# Patient Record
Sex: FEMALE | Race: WHITE | HISPANIC OR LATINO | Employment: FULL TIME
[De-identification: names, ages, dates, MRNs, and addresses within clinical notes are randomized per-mention and may not be internally consistent; named-entity substitution may affect disease eponyms.]

---

## 2017-09-03 ENCOUNTER — HEALTH MAINTENANCE LETTER (OUTPATIENT)
Age: 27
End: 2017-09-03

## 2017-09-11 ENCOUNTER — HOSPITAL ENCOUNTER (EMERGENCY)
Facility: CLINIC | Age: 27
Discharge: HOME | End: 2017-09-11
Attending: EMERGENCY MEDICINE | Admitting: EMERGENCY MEDICINE
Payer: COMMERCIAL

## 2017-09-11 ENCOUNTER — APPOINTMENT (OUTPATIENT)
Dept: CT IMAGING | Facility: CLINIC | Age: 27
End: 2017-09-11
Attending: EMERGENCY MEDICINE
Payer: COMMERCIAL

## 2017-09-11 ENCOUNTER — APPOINTMENT (OUTPATIENT)
Dept: ULTRASOUND IMAGING | Facility: CLINIC | Age: 27
End: 2017-09-11
Attending: EMERGENCY MEDICINE
Payer: COMMERCIAL

## 2017-09-11 VITALS — SYSTOLIC BLOOD PRESSURE: 95 MMHG | OXYGEN SATURATION: 100 % | DIASTOLIC BLOOD PRESSURE: 58 MMHG

## 2017-09-11 DIAGNOSIS — N83.202 OVARIAN CYST, BILATERAL: ICD-10-CM

## 2017-09-11 DIAGNOSIS — R10.2 PELVIC PAIN IN FEMALE: ICD-10-CM

## 2017-09-11 DIAGNOSIS — N93.8 DYSFUNCTIONAL UTERINE BLEEDING: ICD-10-CM

## 2017-09-11 DIAGNOSIS — N39.0 URINARY TRACT INFECTION, SITE UNSPECIFIED: ICD-10-CM

## 2017-09-11 DIAGNOSIS — N83.201 OVARIAN CYST, BILATERAL: ICD-10-CM

## 2017-09-11 LAB
ALBUMIN SERPL-MCNC: 4 G/DL (ref 3.4–5)
ALBUMIN UR-MCNC: NEGATIVE MG/DL
ALP SERPL-CCNC: 76 U/L (ref 40–150)
ALT SERPL W P-5'-P-CCNC: 35 U/L (ref 0–50)
ANION GAP SERPL CALCULATED.3IONS-SCNC: 8 MMOL/L (ref 3–14)
APPEARANCE UR: ABNORMAL
AST SERPL W P-5'-P-CCNC: 24 U/L (ref 0–45)
BACTERIA #/AREA URNS HPF: ABNORMAL /HPF
BASOPHILS # BLD AUTO: 0 10E9/L (ref 0–0.2)
BASOPHILS NFR BLD AUTO: 0.1 %
BILIRUB SERPL-MCNC: 0.7 MG/DL (ref 0.2–1.3)
BILIRUB UR QL STRIP: NEGATIVE
BUN SERPL-MCNC: 9 MG/DL (ref 7–30)
CALCIUM SERPL-MCNC: 8.9 MG/DL (ref 8.5–10.1)
CHLORIDE SERPL-SCNC: 105 MMOL/L (ref 94–109)
CO2 SERPL-SCNC: 23 MMOL/L (ref 20–32)
COLOR UR AUTO: YELLOW
CREAT SERPL-MCNC: 0.48 MG/DL (ref 0.52–1.04)
DIFFERENTIAL METHOD BLD: ABNORMAL
EOSINOPHIL # BLD AUTO: 0.1 10E9/L (ref 0–0.7)
EOSINOPHIL NFR BLD AUTO: 0.4 %
ERYTHROCYTE [DISTWIDTH] IN BLOOD BY AUTOMATED COUNT: 13.2 % (ref 10–15)
GFR SERPL CREATININE-BSD FRML MDRD: >90 ML/MIN/1.7M2
GLUCOSE SERPL-MCNC: 97 MG/DL (ref 70–99)
GLUCOSE UR STRIP-MCNC: NEGATIVE MG/DL
HCG SERPL QL: NEGATIVE
HCT VFR BLD AUTO: 37.5 % (ref 35–47)
HGB BLD-MCNC: 12.8 G/DL (ref 11.7–15.7)
HGB UR QL STRIP: ABNORMAL
IMM GRANULOCYTES # BLD: 0.1 10E9/L (ref 0–0.4)
IMM GRANULOCYTES NFR BLD: 0.3 %
KETONES UR STRIP-MCNC: NEGATIVE MG/DL
LEUKOCYTE ESTERASE UR QL STRIP: ABNORMAL
LIPASE SERPL-CCNC: 133 U/L (ref 73–393)
LYMPHOCYTES # BLD AUTO: 1.6 10E9/L (ref 0.8–5.3)
LYMPHOCYTES NFR BLD AUTO: 9.9 %
MCH RBC QN AUTO: 28.6 PG (ref 26.5–33)
MCHC RBC AUTO-ENTMCNC: 34.1 G/DL (ref 31.5–36.5)
MCV RBC AUTO: 84 FL (ref 78–100)
MONOCYTES # BLD AUTO: 1 10E9/L (ref 0–1.3)
MONOCYTES NFR BLD AUTO: 6 %
MUCOUS THREADS #/AREA URNS LPF: PRESENT /LPF
NEUTROPHILS # BLD AUTO: 13.4 10E9/L (ref 1.6–8.3)
NEUTROPHILS NFR BLD AUTO: 83.3 %
NITRATE UR QL: NEGATIVE
PH UR STRIP: 6 PH (ref 5–7)
PLATELET # BLD AUTO: 286 10E9/L (ref 150–450)
POTASSIUM SERPL-SCNC: 4.4 MMOL/L (ref 3.4–5.3)
PROT SERPL-MCNC: 7.4 G/DL (ref 6.8–8.8)
RBC # BLD AUTO: 4.47 10E12/L (ref 3.8–5.2)
RBC #/AREA URNS AUTO: 4 /HPF (ref 0–2)
SODIUM SERPL-SCNC: 136 MMOL/L (ref 133–144)
SOURCE: ABNORMAL
SP GR UR STRIP: 1.01 (ref 1–1.03)
SPECIMEN SOURCE: NORMAL
SQUAMOUS #/AREA URNS AUTO: 7 /HPF (ref 0–1)
UROBILINOGEN UR STRIP-MCNC: NORMAL MG/DL (ref 0–2)
WBC # BLD AUTO: 16.1 10E9/L (ref 4–11)
WBC #/AREA URNS AUTO: 3 /HPF (ref 0–2)
WET PREP SPEC: NORMAL

## 2017-09-11 PROCEDURE — 81001 URINALYSIS AUTO W/SCOPE: CPT | Performed by: EMERGENCY MEDICINE

## 2017-09-11 PROCEDURE — 96376 TX/PRO/DX INJ SAME DRUG ADON: CPT

## 2017-09-11 PROCEDURE — 80053 COMPREHEN METABOLIC PANEL: CPT | Performed by: EMERGENCY MEDICINE

## 2017-09-11 PROCEDURE — 85025 COMPLETE CBC W/AUTO DIFF WBC: CPT | Performed by: EMERGENCY MEDICINE

## 2017-09-11 PROCEDURE — 87491 CHLMYD TRACH DNA AMP PROBE: CPT | Performed by: EMERGENCY MEDICINE

## 2017-09-11 PROCEDURE — 87591 N.GONORRHOEAE DNA AMP PROB: CPT | Performed by: EMERGENCY MEDICINE

## 2017-09-11 PROCEDURE — 87086 URINE CULTURE/COLONY COUNT: CPT | Performed by: EMERGENCY MEDICINE

## 2017-09-11 PROCEDURE — 25000132 ZZH RX MED GY IP 250 OP 250 PS 637: Performed by: EMERGENCY MEDICINE

## 2017-09-11 PROCEDURE — 93976 VASCULAR STUDY: CPT

## 2017-09-11 PROCEDURE — 87088 URINE BACTERIA CULTURE: CPT | Performed by: EMERGENCY MEDICINE

## 2017-09-11 PROCEDURE — 25000125 ZZHC RX 250: Performed by: EMERGENCY MEDICINE

## 2017-09-11 PROCEDURE — 96365 THER/PROPH/DIAG IV INF INIT: CPT

## 2017-09-11 PROCEDURE — 87210 SMEAR WET MOUNT SALINE/INK: CPT | Performed by: EMERGENCY MEDICINE

## 2017-09-11 PROCEDURE — 25000128 H RX IP 250 OP 636

## 2017-09-11 PROCEDURE — 84703 CHORIONIC GONADOTROPIN ASSAY: CPT | Performed by: EMERGENCY MEDICINE

## 2017-09-11 PROCEDURE — 83690 ASSAY OF LIPASE: CPT | Performed by: EMERGENCY MEDICINE

## 2017-09-11 PROCEDURE — 99285 EMERGENCY DEPT VISIT HI MDM: CPT | Mod: 25

## 2017-09-11 PROCEDURE — 74177 CT ABD & PELVIS W/CONTRAST: CPT

## 2017-09-11 PROCEDURE — 99285 EMERGENCY DEPT VISIT HI MDM: CPT | Performed by: EMERGENCY MEDICINE

## 2017-09-11 PROCEDURE — 25000128 H RX IP 250 OP 636: Performed by: EMERGENCY MEDICINE

## 2017-09-11 PROCEDURE — 96375 TX/PRO/DX INJ NEW DRUG ADDON: CPT

## 2017-09-11 PROCEDURE — 87186 SC STD MICRODIL/AGAR DIL: CPT | Performed by: EMERGENCY MEDICINE

## 2017-09-11 PROCEDURE — 96361 HYDRATE IV INFUSION ADD-ON: CPT | Mod: 59

## 2017-09-11 RX ORDER — IOPAMIDOL 755 MG/ML
59 INJECTION, SOLUTION INTRAVASCULAR ONCE
Status: COMPLETED | OUTPATIENT
Start: 2017-09-11 | End: 2017-09-11

## 2017-09-11 RX ORDER — METOCLOPRAMIDE 10 MG/1
10 TABLET ORAL 4 TIMES DAILY PRN
Qty: 24 TABLET | Refills: 1 | Status: SHIPPED | OUTPATIENT
Start: 2017-09-11 | End: 2019-05-09

## 2017-09-11 RX ORDER — ONDANSETRON 2 MG/ML
INJECTION INTRAMUSCULAR; INTRAVENOUS
Status: COMPLETED
Start: 2017-09-11 | End: 2017-09-11

## 2017-09-11 RX ORDER — HYDROMORPHONE HYDROCHLORIDE 1 MG/ML
0.5 INJECTION, SOLUTION INTRAMUSCULAR; INTRAVENOUS; SUBCUTANEOUS ONCE
Status: COMPLETED | OUTPATIENT
Start: 2017-09-11 | End: 2017-09-11

## 2017-09-11 RX ORDER — CIPROFLOXACIN 500 MG/1
500 TABLET, FILM COATED ORAL 2 TIMES DAILY
Qty: 14 TABLET | Refills: 0 | Status: SHIPPED | OUTPATIENT
Start: 2017-09-11 | End: 2017-09-11

## 2017-09-11 RX ORDER — ONDANSETRON 2 MG/ML
4 INJECTION INTRAMUSCULAR; INTRAVENOUS ONCE
Status: COMPLETED | OUTPATIENT
Start: 2017-09-11 | End: 2017-09-11

## 2017-09-11 RX ORDER — METOCLOPRAMIDE HYDROCHLORIDE 5 MG/ML
10 INJECTION INTRAMUSCULAR; INTRAVENOUS ONCE
Status: COMPLETED | OUTPATIENT
Start: 2017-09-11 | End: 2017-09-11

## 2017-09-11 RX ORDER — CEFTRIAXONE 1 G/1
1 INJECTION, POWDER, FOR SOLUTION INTRAMUSCULAR; INTRAVENOUS ONCE
Status: COMPLETED | OUTPATIENT
Start: 2017-09-11 | End: 2017-09-11

## 2017-09-11 RX ORDER — AZITHROMYCIN 250 MG/1
1000 TABLET, FILM COATED ORAL ONCE
Status: COMPLETED | OUTPATIENT
Start: 2017-09-11 | End: 2017-09-11

## 2017-09-11 RX ORDER — METOCLOPRAMIDE 10 MG/1
10 TABLET ORAL 4 TIMES DAILY PRN
Qty: 24 TABLET | Refills: 1 | Status: SHIPPED | OUTPATIENT
Start: 2017-09-11 | End: 2017-09-11

## 2017-09-11 RX ORDER — ONDANSETRON 4 MG/1
4 TABLET, ORALLY DISINTEGRATING ORAL EVERY 8 HOURS PRN
Qty: 15 TABLET | Refills: 1 | Status: SHIPPED | OUTPATIENT
Start: 2017-09-11 | End: 2017-09-11

## 2017-09-11 RX ORDER — ONDANSETRON 4 MG/1
4 TABLET, ORALLY DISINTEGRATING ORAL EVERY 8 HOURS PRN
Qty: 15 TABLET | Refills: 1 | Status: SHIPPED | OUTPATIENT
Start: 2017-09-11 | End: 2019-05-09

## 2017-09-11 RX ORDER — OXYCODONE AND ACETAMINOPHEN 5; 325 MG/1; MG/1
2 TABLET ORAL ONCE
Status: DISCONTINUED | OUTPATIENT
Start: 2017-09-11 | End: 2017-09-11 | Stop reason: HOSPADM

## 2017-09-11 RX ORDER — TRAMADOL HYDROCHLORIDE 50 MG/1
50-100 TABLET ORAL EVERY 6 HOURS PRN
Qty: 20 TABLET | Refills: 0 | Status: SHIPPED | OUTPATIENT
Start: 2017-09-11 | End: 2019-05-09

## 2017-09-11 RX ORDER — CIPROFLOXACIN 500 MG/1
500 TABLET, FILM COATED ORAL 2 TIMES DAILY
Qty: 14 TABLET | Refills: 0 | Status: SHIPPED | OUTPATIENT
Start: 2017-09-11 | End: 2017-09-15 | Stop reason: ALTCHOICE

## 2017-09-11 RX ORDER — KETOROLAC TROMETHAMINE 30 MG/ML
30 INJECTION, SOLUTION INTRAMUSCULAR; INTRAVENOUS ONCE
Status: COMPLETED | OUTPATIENT
Start: 2017-09-11 | End: 2017-09-11

## 2017-09-11 RX ADMIN — HYDROMORPHONE HYDROCHLORIDE 0.5 MG: 1 INJECTION, SOLUTION INTRAMUSCULAR; INTRAVENOUS; SUBCUTANEOUS at 11:47

## 2017-09-11 RX ADMIN — ONDANSETRON 4 MG: 2 INJECTION INTRAMUSCULAR; INTRAVENOUS at 12:29

## 2017-09-11 RX ADMIN — SODIUM CHLORIDE 1000 ML: 9 INJECTION, SOLUTION INTRAVENOUS at 09:01

## 2017-09-11 RX ADMIN — SODIUM CHLORIDE 59 ML: 9 INJECTION, SOLUTION INTRAVENOUS at 10:17

## 2017-09-11 RX ADMIN — ONDANSETRON 4 MG: 2 INJECTION INTRAMUSCULAR; INTRAVENOUS at 09:02

## 2017-09-11 RX ADMIN — HYDROMORPHONE HYDROCHLORIDE 0.5 MG: 1 INJECTION, SOLUTION INTRAMUSCULAR; INTRAVENOUS; SUBCUTANEOUS at 09:05

## 2017-09-11 RX ADMIN — CEFTRIAXONE 1 G: 1 INJECTION, POWDER, FOR SOLUTION INTRAMUSCULAR; INTRAVENOUS at 13:28

## 2017-09-11 RX ADMIN — IOPAMIDOL 59 ML: 755 INJECTION, SOLUTION INTRAVENOUS at 10:17

## 2017-09-11 RX ADMIN — KETOROLAC TROMETHAMINE 30 MG: 30 INJECTION, SOLUTION INTRAMUSCULAR at 10:04

## 2017-09-11 RX ADMIN — AZITHROMYCIN 1000 MG: 250 TABLET, FILM COATED ORAL at 13:30

## 2017-09-11 RX ADMIN — METOCLOPRAMIDE 10 MG: 5 INJECTION, SOLUTION INTRAMUSCULAR; INTRAVENOUS at 13:25

## 2017-09-11 NOTE — ED AVS SNAPSHOT
[facility] Emergency Department    [address] [address]    Phone:  [phone]    Fax:  [phone]                                       Tiara Johnston   MRN: [id number]    Department:  [facility] Emergency Department   Date of Visit:  9/11/2017           After Visit Summary Signature Page     I have received my discharge instructions, and my questions have been answered. I have discussed any challenges I see with this plan with the nurse or doctor.    ..........................................................................................................................................  Patient/Patient Representative Signature      ..........................................................................................................................................  Patient Representative Print Name and Relationship to Patient    ..................................................               ................................................  Date                                            Time    ..........................................................................................................................................  Reviewed by Signature/Title    ...................................................              ..............................................  Date                                                            Time

## 2017-09-11 NOTE — ED AVS SNAPSHOT
[facility] Emergency Department    [address] [facility] [address]    Phone:  [phone]    Fax:  [phone]                                       Tiara Johnston   MRN: [id number]    Department:  [facility] Emergency Department   Date of Visit:  9/11/2017           Patient Information     Date Of Birth          1990        Your diagnoses for this visit were:     Pelvic pain in female     Ovarian cyst, bilateral     Dysfunctional uterine bleeding     Urinary tract infection, site unspecified        You were seen by Mian Goldberg MD.      Follow-up Information     Schedule an appointment as soon as possible for a visit with [facility].    Contact information:    [address] [place] [id number]          Please follow up.    Why:  If symptoms worsen, fever, new problems or concerns.      Discharge References/Attachments     OVARIAN CYSTS, UNDERSTANDING (ENGLISH)    OVARIAN CYST (ENGLISH)    OVARIAN CYSTS (ENGLISH)    DYSFUNCTIONAL UTERINE BLEEDING (ENGLISH)    PELVIC PAIN, UNKNOWN CAUSE (ENGLISH)    URINARY TRACT INFECTIONS IN WOMEN (ENGLISH)    URINARY TRACT INFECTIONS (UTIS), UNDERSTANDING (ENGLISH)      24 Hour Appointment Hotline       To make an appointment at any [facility] clinic, call [phone] ([phone]). If you don't have a family doctor or clinic, we will help you find one. [facility] clinics are conveniently located to serve the needs of you and your family.             Review of your medicines      START taking        Dose / Directions Last dose taken    ciprofloxacin 500 MG tablet   Commonly known as:  CIPRO   Dose:  500 mg   Quantity:  14 tablet        Take 1 tablet (500 mg) by mouth 2 times daily   Refills:  0        metoclopramide 10 MG tablet   Commonly known as:  REGLAN   Dose:  10 mg   Quantity:  24 tablet        Take 1 tablet (10 mg) by mouth 4 times daily as needed for nausea   Refills:  1        ondansetron 4 MG ODT tab    Commonly known as:  ZOFRAN ODT   Dose:  4 mg   Quantity:  15 tablet        Take 1 tablet (4 mg) by mouth every 8 hours as needed for nausea   Refills:  1        traMADol 50 MG tablet   Commonly known as:  ULTRAM   Dose:   mg   Quantity:  20 tablet        Take 1-2 tablets ( mg) by mouth every 6 hours as needed for pain   Refills:  0                Prescriptions were sent or printed at these locations (4 Prescriptions)                   [facility] - [place], [place] - [address]   [address] [address] [facility] [address]    Telephone:  [phone]   Fax:  [phone]   Hours:                  E-Prescribed (3 of 3)         ondansetron (ZOFRAN ODT) 4 MG ODT tab               metoclopramide (REGLAN) 10 MG tablet               ciprofloxacin (CIPRO) 500 MG tablet                     [facility] [place] [place] - [address] [address]   [address] [address]    Telephone:  [phone]   Fax:  [phone]   Hours:  LIVAN Morgan                Printed at Department/Unit printer (1 of 1)         traMADol (ULTRAM) 50 MG tablet                Procedures and tests performed during your visit     Abd/pelvis CT, IV contrast only TRAUMA  / AAA    CBC with platelets, differential    Chlamydia trachomatis PCR    Comprehensive metabolic panel    HCG qualitative pregnancy (blood)    Lipase    Neisseria gonorrhoea PCR    Pelvic Ultrasound (US) with doppler imaging (r/o ovarian torsion)    Saline Lock IV    UA with Microscopic    Urine Culture    Wet prep      Orders Needing Specimen Collection     None      Pending Results     Date and Time Order Name Status Description    9/11/2017 1115 Neisseria gonorrhoea PCR In process     9/11/2017 1115 Chlamydia trachomatis PCR In process     9/11/2017 0953 Urine Culture Preliminary             Pending Culture Results     Date and Time Order Name Status Description    9/11/2017 1115 Neisseria gonorrhoea PCR In process      9/11/2017 1115 Chlamydia trachomatis PCR In process     9/11/2017 0953 Urine Culture Preliminary             Pending Results Instructions     If you had any lab results that were not finalized at the time of your Discharge, you can call the ED Lab Result RN at [phone]. You will be contacted by this team for any positive Lab results or changes in treatment. The nurses are available 7 days a week from 10A to 6:30P.  You can leave a message 24 hours per day and they will return your call.        Test Results From Your Hospital Stay        9/11/2017  8:34 AM      Component Results     Component Value Ref Range & Units Status    WBC 16.1 (H) 4.0 - 11.0 10e9/L Final    RBC Count 4.47 3.8 - 5.2 10e12/L Final    Hemoglobin 12.8 11.7 - 15.7 g/dL Final    Hematocrit 37.5 35.0 - 47.0 % Final    MCV 84 78 - 100 fl Final    MCH 28.6 26.5 - 33.0 pg Final    MCHC 34.1 31.5 - 36.5 g/dL Final    RDW 13.2 10.0 - 15.0 % Final    Platelet Count 286 150 - 450 10e9/L Final    Diff Method Automated Method  Final    % Neutrophils 83.3 % Final    % Lymphocytes 9.9 % Final    % Monocytes 6.0 % Final    % Eosinophils 0.4 % Final    % Basophils 0.1 % Final    % Immature Granulocytes 0.3 % Final    Absolute Neutrophil 13.4 (H) 1.6 - 8.3 10e9/L Final    Absolute Lymphocytes 1.6 0.8 - 5.3 10e9/L Final    Absolute Monocytes 1.0 0.0 - 1.3 10e9/L Final    Absolute Eosinophils 0.1 0.0 - 0.7 10e9/L Final    Absolute Basophils 0.0 0.0 - 0.2 10e9/L Final    Abs Immature Granulocytes 0.1 0 - 0.4 10e9/L Final         9/11/2017 10:07 AM      Component Results     Component Value Ref Range & Units Status    Sodium 136 133 - 144 mmol/L Final    Potassium 4.4 3.4 - 5.3 mmol/L Final    Specimen slightly hemolyzed, potassium may be falsely elevated    Chloride 105 94 - 109 mmol/L Final    Carbon Dioxide 23 20 - 32 mmol/L Final    Anion Gap 8 3 - 14 mmol/L Final    Glucose 97 70 - 99 mg/dL Final    Urea Nitrogen 9 7 - 30 mg/dL Final    Creatinine 0.48  (L) 0.52 - 1.04 mg/dL Final    GFR Estimate >90 >60 mL/min/1.7m2 Final    Non  GFR Calc    GFR Estimate If Black >90 >60 mL/min/1.7m2 Final    African American GFR Calc    Calcium 8.9 8.5 - 10.1 mg/dL Final    Bilirubin Total 0.7 0.2 - 1.3 mg/dL Final    Albumin 4.0 3.4 - 5.0 g/dL Final    Protein Total 7.4 6.8 - 8.8 g/dL Final    Alkaline Phosphatase 76 40 - 150 U/L Final    ALT 35 0 - 50 U/L Final    AST 24 0 - 45 U/L Final    Specimen is hemolyzed which can falsely elevate AST. Analysis of a   non-hemolyzed specimen may result in a lower value.           9/11/2017 10:05 AM      Component Results     Component Value Ref Range & Units Status    HCG Qualitative Serum Negative NEG^Negative Final    This test is for screening purposes.  Results should be interpreted along with   the clinical picture.  Confirmation testing is available if warranted by   ordering [id number], HCG Quantitative Pregnancy.           9/11/2017 10:07 AM      Component Results     Component Value Ref Range & Units Status    Lipase 133 73 - 393 U/L Final         9/11/2017  9:52 AM      Component Results     Component Value Ref Range & Units Status    Color Urine Yellow  Final    Appearance Urine Slightly Cloudy  Final    Glucose Urine Negative NEG^Negative mg/dL Final    Bilirubin Urine Negative NEG^Negative Final    Ketones Urine Negative NEG^Negative mg/dL Final    Specific Gravity Urine 1.013 1.003 - 1.035 Final    Blood Urine Small (A) NEG^Negative Final    pH Urine 6.0 5.0 - 7.0 pH Final    Protein Albumin Urine Negative NEG^Negative mg/dL Final    Urobilinogen mg/dL Normal 0.0 - 2.0 mg/dL Final    Nitrite Urine Negative NEG^Negative Final    Leukocyte Esterase Urine Trace (A) NEG^Negative Final    Source Midstream Urine  Final    WBC Urine 3 (H) 0 - 2 /HPF Final    RBC Urine 4 (H) 0 - 2 /HPF Final    Bacteria Urine Few (A) NEG^Negative /HPF Final    Squamous Epithelial /HPF Urine 7 (H) 0 - 1 /HPF Corrected    Clue cells  present  CORRECTED ON 09/11 AT 0952: PREVIOUSLY REPORTED AS 7      Mucous Urine Present (A) NEG^Negative /LPF Final         9/11/2017  1:30 PM      Component Results     Component    Specimen Description    Midstream Urine    Special Requests    Specimen received in preservative    Culture Micro    PENDING         9/11/2017 10:45 AM      Narrative     CT ABDOMEN PELVIS W CONTRAST 9/11/2017 10:28 AM    TECHNIQUE: Images from diaphragm to pubic symphysis 59mL [id number]-370 IV  contrast  Radiation dose for this scan was reduced using automated exposure  control, adjustment of the mA and/or kV according to patient size, or  iterative reconstruction technique.    HISTORY: Lower abdominal pain    COMPARISON: None.    FINDINGS:   Abdomen and Pelvis: 2 subcentimeter low attenuation foci in the medial  right lobe of the liver on series 3 images 15 and 30 are  indeterminate. Normal-appearing gallbladder, spleen, pancreas, adrenal  glands and kidneys. No periaortic or pelvic adenopathy.    Rounded 3.1 cm fluid collection in the posterior low right pelvis,  favor free fluid. 2.6 cm left pelvic cyst compatible with an ovarian  cyst or dominant follicle. There is a anterior 1.4 cm right pelvic  cyst adjacent to the uterus consistent with small follicle in the  right ovary. Series 2 image 21 hypoechoic 2.2 cm area in the right  pelvis is 3.3 x 1.2 cm on axial images, possibly complex elongated  ovarian or adnexal cystic lesion.    Normal-appearing appendix (series 3 image 17-18. No acute appearing  bowel abnormality.        Impression     IMPRESSION :  1. Indeterminate elongated short tubular hypoechoic right pelvic  structure just superior to the uterus, possibly related to fallopian  tube/adnexa. Pelvic ultrasound may be helpful for further evaluation.  Small ovarian cysts. Small free fluid.  2. 2 tiny hypodense liver lesions are too small to characterize.    DORIS PAZ MD         9/11/2017 12:47 PM      Narrative     PELVIC  ULTRASOUND 9/11/2017 12:31 PM    HISTORY: Severe pelvic pain, question right fallopian tube abnormality  on CT    COMPARISON; 9/11/2017 CT abdomen and pelvis    TECHNIQUE: Transabdominal and endovaginal technique to better  visualize endometrial stripe and adnexa.    FINDINGS: Uterus is 9.6 x 4.3 x 5.8 cm in size with small amount of  fluid in the endometrial cavity and cervical canal. Endometrial stripe  (excluding the fluid) is 0.7 cm. No fibroids.    Simple appearing left ovarian cyst 3.0 x 2.2 x 2.8 cm.  Normal-appearing right ovary without dominant cyst. Doppler waveform  analysis demonstrates arterial and venous flow to both ovaries. No  sonographic evidence for hydrosalpinx. There is a 2.2 x 1.5 x 1.1 cm  slightly lobulated cystic structure in the right adnexa. Prominent  adnexal vessels in the right adnexal region as well. No free fluid.    IMPRESSION;  1. 2.2 x 1.5 x 1.1 cm bilobed cystic structure right adnexa may be a  paraovarian cyst but nonspecific. This probably corresponds to the  cystic area in the right pelvis CT coronal series 3 image 18 and axial  image 67 but is separate from the more superior hypodense area on CT  which is indeterminate but may represent a small bowel loop.  2. Normal-appearing right ovary. No evidence for hydrosalpinx.  3. 3 cm simple left ovarian cyst.  4. No free fluid seen on ultrasound although free fluid in the right  cul-de-sac is suspected on the CT.  5. Small fluid in the endometrial canal and cervical canal.    DORIS PAZ MD         9/11/2017  1:03 PM         9/11/2017  1:03 PM         9/11/2017  1:15 PM      Component Results     Component    Specimen Description    Vagina    Wet Prep    Moderate  WBC'S seen      Wet Prep    No clue cells seen    Wet Prep    No Trichomonas seen    Wet Prep    No yeast seen                Thank you for choosing [facility]       Thank you for choosing [facility] for your care. Our goal is always to provide you with excellent care.  Hearing back from our patients is one way we can continue to improve our services. Please take a few minutes to complete the written survey that you may receive in the mail after you visit with us. Thank you!        [facility] Information     [employer] gives you secure access to your electronic health record. If you see a primary care provider, you can also send messages to your care team and make appointments. If you have questions, please call your primary care clinic.  If you do not have a primary care provider, please call [phone] and they will assist you.        Care EveryWhere ID     This is your Care EveryWhere ID. This could be used by other organizations to access your [facility] medical records  [id number]        Equal Access to Services     GEORGIA ALBERTS : Sumit Bolanos, marylou peres, alvaro gaona, beatrice ascencio. So [facility] [phone].    ATENCIÓN: Si habla español, tiene a mota disposición servicios gratuitos de asistencia lingüística. Llame al [phone].    We comply with applicable federal civil rights laws and [place] laws. We do not discriminate on the basis of race, color, national origin, age, disability sex, sexual orientation or gender identity.            After Visit Summary       This is your record. Keep this with you and show to your community pharmacist(s) and doctor(s) at your next visit.

## 2017-09-11 NOTE — ED PROVIDER NOTES
History     Chief Complaint   Patient presents with     Abdominal Pain     and bloating since 2400     HPI  Tiara Johnston is a 27 year old female with sudden onset of lower abd pain at ~ midnight last night. Abd pain is constant, severe, radiates across lower abdomen, is described as a bloating, cramping, sharp pain aggravated by urinating, bowel movement, movement, change in position and walking.  She has associated low back pain, nonlocalized. No fever, feels chilled. Last BM was this am and was NL.  She has urinary urgency but no other UTI signs or symptoms or hematuria.  Last menstrual period was - but she has continued to have spotting since that time.  LMP prior to that was normal, beginning of August.  No vaginal discharge.  , monogamous.  No other acute complaints or concerns.    I have reviewed the Medications, Allergies, Past Medical and Surgical History, and Social History in the Epic system.  Patient Active Problem List   Diagnosis     H/O:  section     Thyroid nodule     Contraception     HSV-1 infection     CARDIOVASCULAR SCREENING; LDL GOAL LESS THAN 160     Guttate psoriasis     Subclinical hyperthyroidism     Past Medical History:   Diagnosis Date      delivery     placenta previa     Psoriasis      Past Surgical History:   Procedure Laterality Date     BACK SURGERY       C/SECTION, LOW TRANSVERSE  08    , Low Transverse     HEAD & NECK SURGERY       Current Facility-Administered Medications   Medication     oxyCODONE-acetaminophen (PERCOCET) 5-325 MG per tablet 2 tablet     Current Outpatient Prescriptions   Medication     traMADol (ULTRAM) 50 MG tablet     ondansetron (ZOFRAN ODT) 4 MG ODT tab     metoclopramide (REGLAN) 10 MG tablet     ciprofloxacin (CIPRO) 500 MG tablet     Allergies   Allergen Reactions     Penicillins Rash     Social History   Substance Use Topics     Smoking status: Never Smoker     Smokeless tobacco: Never Used     Alcohol use  Yes      Comment: occassional; social     Family History   Problem Relation Age of Onset     Asthma Mother      Hypertension Mother      Thyroid Disease Mother      Hypertension Maternal Grandmother      Alzheimer Disease Maternal Grandfather      Asthma Brother      Asthma Brother      Asthma Son      Respiratory Son      sleep apnea     Asthma Daughter      Respiratory Daughter      sleep apnea     Unknown/Adopted Father      Unknown/Adopted Paternal Grandmother      Unknown/Adopted Paternal Grandfather      Review of Systems  As mentioned above in the history present illness.  All other systems were reviewed and are negative.    Physical Exam      Physical Exam   Constitutional: She is oriented to person, place, and time. She appears well-developed and well-nourished. No distress.   HENT:   Head: Normocephalic and atraumatic.   Mouth/Throat: Oropharynx is clear and moist.   Eyes: Conjunctivae and EOM are normal. No scleral icterus.   Neck: Normal range of motion. Neck supple.   Cardiovascular: Normal rate, regular rhythm and normal heart sounds.  Exam reveals no gallop and no friction rub.    No murmur heard.  Pulmonary/Chest: Effort normal and breath sounds normal. No respiratory distress. She has no wheezes. She has no rales.   Abdominal: Soft. Bowel sounds are normal. She exhibits no distension, no abdominal bruit, no pulsatile midline mass and no mass. There is tenderness (diffuse lower abdominal tenderness). There is no rigidity, no rebound, no guarding, no CVA tenderness, no tenderness at McBurney's point and negative Mead's sign.       Genitourinary:   Genitourinary Comments: Pelvic examination: Normal external genitalia.  Moderate amount of thin mucousy white-yellow vaginal discharge.  Normal cervix.  Uterine tenderness and right adnexal tenderness without palpable mass.  No left adnexal tenderness or mass.   Musculoskeletal: Normal range of motion. She exhibits no edema.   Neurological: She is alert  and oriented to person, place, and time.   Skin: Skin is warm and dry. No rash noted. She is not diaphoretic. No erythema. No pallor.   Psychiatric: She has a normal mood and affect. Her behavior is normal.   Nursing note and vitals reviewed.      ED Course     ED Course     Procedures        Results for orders placed or performed during the hospital encounter of 09/11/17   Abd/pelvis CT, IV contrast only TRAUMA  / AAA    Narrative    CT ABDOMEN PELVIS W CONTRAST 9/11/2017 10:28 AM    TECHNIQUE: Images from diaphragm to pubic symphysis 59mL Isovue-370 IV  contrast  Radiation dose for this scan was reduced using automated exposure  control, adjustment of the mA and/or kV according to patient size, or  iterative reconstruction technique.    HISTORY: Lower abdominal pain    COMPARISON: None.    FINDINGS:   Abdomen and Pelvis: 2 subcentimeter low attenuation foci in the medial  right lobe of the liver on series 3 images 15 and 30 are  indeterminate. Normal-appearing gallbladder, spleen, pancreas, adrenal  glands and kidneys. No periaortic or pelvic adenopathy.    Rounded 3.1 cm fluid collection in the posterior low right pelvis,  favor free fluid. 2.6 cm left pelvic cyst compatible with an ovarian  cyst or dominant follicle. There is a anterior 1.4 cm right pelvic  cyst adjacent to the uterus consistent with small follicle in the  right ovary. Series 2 image 21 hypoechoic 2.2 cm area in the right  pelvis is 3.3 x 1.2 cm on axial images, possibly complex elongated  ovarian or adnexal cystic lesion.    Normal-appearing appendix (series 3 image 17-18. No acute appearing  bowel abnormality.      Impression    IMPRESSION :  1. Indeterminate elongated short tubular hypoechoic right pelvic  structure just superior to the uterus, possibly related to fallopian  tube/adnexa. Pelvic ultrasound may be helpful for further evaluation.  Small ovarian cysts. Small free fluid.  2. 2 tiny hypodense liver lesions are too small to  characterize.    DORIS PAZ MD   Pelvic Ultrasound (US) with doppler imaging (r/o ovarian torsion)    Narrative    PELVIC ULTRASOUND 9/11/2017 12:31 PM    HISTORY: Severe pelvic pain, question right fallopian tube abnormality  on CT    COMPARISON; 9/11/2017 CT abdomen and pelvis    TECHNIQUE: Transabdominal and endovaginal technique to better  visualize endometrial stripe and adnexa.    FINDINGS: Uterus is 9.6 x 4.3 x 5.8 cm in size with small amount of  fluid in the endometrial cavity and cervical canal. Endometrial stripe  (excluding the fluid) is 0.7 cm. No fibroids.    Simple appearing left ovarian cyst 3.0 x 2.2 x 2.8 cm.  Normal-appearing right ovary without dominant cyst. Doppler waveform  analysis demonstrates arterial and venous flow to both ovaries. No  sonographic evidence for hydrosalpinx. There is a 2.2 x 1.5 x 1.1 cm  slightly lobulated cystic structure in the right adnexa. Prominent  adnexal vessels in the right adnexal region as well. No free fluid.    IMPRESSION;  1. 2.2 x 1.5 x 1.1 cm bilobed cystic structure right adnexa may be a  paraovarian cyst but nonspecific. This probably corresponds to the  cystic area in the right pelvis CT coronal series 3 image 18 and axial  image 67 but is separate from the more superior hypodense area on CT  which is indeterminate but may represent a small bowel loop.  2. Normal-appearing right ovary. No evidence for hydrosalpinx.  3. 3 cm simple left ovarian cyst.  4. No free fluid seen on ultrasound although free fluid in the right  cul-de-sac is suspected on the CT.  5. Small fluid in the endometrial canal and cervical canal.    DORIS PAZ MD   CBC with platelets, differential   Result Value Ref Range    WBC 16.1 (H) 4.0 - 11.0 10e9/L    RBC Count 4.47 3.8 - 5.2 10e12/L    Hemoglobin 12.8 11.7 - 15.7 g/dL    Hematocrit 37.5 35.0 - 47.0 %    MCV 84 78 - 100 fl    MCH 28.6 26.5 - 33.0 pg    MCHC 34.1 31.5 - 36.5 g/dL    RDW 13.2 10.0 - 15.0 %    Platelet  Count 286 150 - 450 10e9/L    Diff Method Automated Method     % Neutrophils 83.3 %    % Lymphocytes 9.9 %    % Monocytes 6.0 %    % Eosinophils 0.4 %    % Basophils 0.1 %    % Immature Granulocytes 0.3 %    Absolute Neutrophil 13.4 (H) 1.6 - 8.3 10e9/L    Absolute Lymphocytes 1.6 0.8 - 5.3 10e9/L    Absolute Monocytes 1.0 0.0 - 1.3 10e9/L    Absolute Eosinophils 0.1 0.0 - 0.7 10e9/L    Absolute Basophils 0.0 0.0 - 0.2 10e9/L    Abs Immature Granulocytes 0.1 0 - 0.4 10e9/L   Comprehensive metabolic panel   Result Value Ref Range    Sodium 136 133 - 144 mmol/L    Potassium 4.4 3.4 - 5.3 mmol/L    Chloride 105 94 - 109 mmol/L    Carbon Dioxide 23 20 - 32 mmol/L    Anion Gap 8 3 - 14 mmol/L    Glucose 97 70 - 99 mg/dL    Urea Nitrogen 9 7 - 30 mg/dL    Creatinine 0.48 (L) 0.52 - 1.04 mg/dL    GFR Estimate >90 >60 mL/min/1.7m2    GFR Estimate If Black >90 >60 mL/min/1.7m2    Calcium 8.9 8.5 - 10.1 mg/dL    Bilirubin Total 0.7 0.2 - 1.3 mg/dL    Albumin 4.0 3.4 - 5.0 g/dL    Protein Total 7.4 6.8 - 8.8 g/dL    Alkaline Phosphatase 76 40 - 150 U/L    ALT 35 0 - 50 U/L    AST 24 0 - 45 U/L   HCG qualitative pregnancy (blood)   Result Value Ref Range    HCG Qualitative Serum Negative NEG^Negative   Lipase   Result Value Ref Range    Lipase 133 73 - 393 U/L   UA with Microscopic   Result Value Ref Range    Color Urine Yellow     Appearance Urine Slightly Cloudy     Glucose Urine Negative NEG^Negative mg/dL    Bilirubin Urine Negative NEG^Negative    Ketones Urine Negative NEG^Negative mg/dL    Specific Gravity Urine 1.013 1.003 - 1.035    Blood Urine Small (A) NEG^Negative    pH Urine 6.0 5.0 - 7.0 pH    Protein Albumin Urine Negative NEG^Negative mg/dL    Urobilinogen mg/dL Normal 0.0 - 2.0 mg/dL    Nitrite Urine Negative NEG^Negative    Leukocyte Esterase Urine Trace (A) NEG^Negative    Source Midstream Urine     WBC Urine 3 (H) 0 - 2 /HPF    RBC Urine 4 (H) 0 - 2 /HPF    Bacteria Urine Few (A) NEG^Negative /HPF     Squamous Epithelial /HPF Urine 7 (H) 0 - 1 /HPF    Mucous Urine Present (A) NEG^Negative /LPF   Urine Culture   Result Value Ref Range    Specimen Description Midstream Urine     Special Requests Specimen received in preservative     Culture Micro PENDING    Wet prep   Result Value Ref Range    Specimen Description Vagina     Wet Prep Moderate  WBC'S seen       Wet Prep No clue cells seen     Wet Prep No Trichomonas seen     Wet Prep No yeast seen         Medications   oxyCODONE-acetaminophen (PERCOCET) 5-325 MG per tablet 2 tablet (2 tablets Oral Not Given 9/11/17 1327)   HYDROmorphone (PF) (DILAUDID) injection 0.5 mg (0.5 mg Intravenous Given 9/11/17 0905)   ondansetron (ZOFRAN) injection 4 mg (4 mg Intravenous Given 9/11/17 0902)   0.9% sodium chloride BOLUS (0 mLs Intravenous Stopped 9/11/17 1148)   ketorolac (TORADOL) injection 30 mg (30 mg Intravenous Given 9/11/17 1004)   iopamidol (ISOVUE-370) solution 59 mL (59 mLs Intravenous Given 9/11/17 1017)   sodium chloride 0.9 % bag 500mL for CT scan flush use (59 mLs Intravenous Given 9/11/17 1017)   HYDROmorphone (PF) (DILAUDID) injection 0.5 mg (0.5 mg Intravenous Given 9/11/17 1147)   ondansetron (ZOFRAN) injection 4 mg (4 mg Intravenous Given 9/11/17 1229)   metoclopramide (REGLAN) injection 10 mg (10 mg Intravenous Given 9/11/17 1325)   cefTRIAXone (ROCEPHIN) 1 g vial to attach to  mL bag for ADULTS or NS 50 mL bag for PEDS (1 g Intravenous New Bag 9/11/17 1328)   azithromycin (ZITHROMAX) tablet 1,000 mg (1,000 mg Oral Given 9/11/17 1330)       1:08 PM - Reviewed case, evaluation and disposition plan with Dr. Spain, OB/GYN.  We discussed treatment, follow-up and disposition plan.      Assessments & Plan (with Medical Decision Making)   27-year-old female with with acute pelvic pain and urinary urgency with extensive evaluation remarkable for right paraovarian cyst, left ovarian cyst and possible UTI.  Significant pain and nausea was difficult to  control.  No other apparent emergent GI or  disease process on laboratory evaluation, CT scan and ultrasound evaluation.  She was treated prophylactically for PID with ceftriaxone and azithromycin.  Pain and nausea adequately controlled and she is comfortable with discharge home with her  with instructions for supportive care, rx for Cipro for possible UTI (urine culture pending), Zofran and Reglan for nausea, and Tramadol for pain refractory to Ibuprofen.  Patient is intolerant of opiate analgesics.  I recommend she follow up in primary care clinic for recheck later this week and that she get a follow-up ultrasound in the future.  She and her significant other expressed understanding of this. Patient was provided instructions for supportive care and will return as needed for worsened condition or worsening symptoms, or new problems or concerns.      I have reviewed the nursing notes.    I have reviewed the findings, diagnosis, plan and need for follow up with the patient.    New Prescriptions    CIPROFLOXACIN (CIPRO) 500 MG TABLET    Take 1 tablet (500 mg) by mouth 2 times daily    METOCLOPRAMIDE (REGLAN) 10 MG TABLET    Take 1 tablet (10 mg) by mouth 4 times daily as needed for nausea    ONDANSETRON (ZOFRAN ODT) 4 MG ODT TAB    Take 1 tablet (4 mg) by mouth every 8 hours as needed for nausea    TRAMADOL (ULTRAM) 50 MG TABLET    Take 1-2 tablets ( mg) by mouth every 6 hours as needed for pain       Final diagnoses:   Pelvic pain in female   Ovarian cyst, bilateral   Dysfunctional uterine bleeding   Urinary tract infection, site unspecified       9/11/2017   [facility] EMERGENCY DEPARTMENT     Mian Goldberg MD  09/11/17 1726

## 2017-09-12 LAB
C TRACH DNA SPEC QL NAA+PROBE: NEGATIVE
N GONORRHOEA DNA SPEC QL NAA+PROBE: NEGATIVE
SPECIMEN SOURCE: NORMAL
SPECIMEN SOURCE: NORMAL

## 2017-09-13 ENCOUNTER — TELEPHONE (OUTPATIENT)
Dept: EMERGENCY MEDICINE | Facility: CLINIC | Age: 27
End: 2017-09-13

## 2017-09-13 DIAGNOSIS — N39.0 URINARY TRACT INFECTION: ICD-10-CM

## 2017-09-13 LAB
BACTERIA SPEC CULT: ABNORMAL
BACTERIA SPEC CULT: ABNORMAL
Lab: ABNORMAL
SPECIMEN SOURCE: ABNORMAL

## 2017-09-13 NOTE — LETTER
September 15, 2017        Tiara Johnston  [id number] [address]          Dear Tiara Johnston:    You were seen in the [facility] at [facility] DEPARTMENT on 9/11/2017.  We are unable to reach you by phone, so we are sending you this letter.     It is important that you call [facility] lab Result nurse at [phone] as we MAY have to make some changes in your treatment.     Best time to call back is between 10 a.m. and 6 p.m.      Sincerely,           Wilner Registered Nurse  [facility] ED Lab Result RN  [phone]

## 2017-09-13 NOTE — TELEPHONE ENCOUNTER
[facility] Emergency Department Lab result notification [Adult-Female]    [facility] ED lab result protocol used  Urine Culture    Reason for call  Notify of lab results, assess symptoms,  review ED providers recommendations/discharge instructions (if necessary) and advise per ED lab result f/u protocol    Lab Result (including Rx patient on, if applicable)  Final Urine Culture Report on 9/13/17.  [place] ED discharge antibiotic's: Ciprofloxacin (Cipro) 500 mg tablet, Take 1 tablet (500 mg) by mouth 2 times daily for 7 days  Bacteria #1: >100,000 colonies/mL Beta hemolytic Streptococcus group B  is [RESISTANT] to ED discharge antibiotic.    Bacteria #2: 10,000 to 50,000 colonies/mL Escherichia coli is [SUSCEPTIBLE] to ED discharge antibiotic.   Recommendations in treatment per  ED Lab result protocol.    Information table from ED Provider visit on 9/11/17  ED diagnosis   Pelvic pain in female   ED provider   Mian Goldberg MD    Symptoms reported at ED visit (Chief complaint, HPI) Tiara Johnston is a 27 year old female with sudden onset of lower abd pain at ~ midnight last night. Abd pain is constant, severe, radiates across lower abdomen, is described as a bloating, cramping, sharp pain aggravated by urinating, bowel movement, movement, change in position and walking.  She has associated low back pain, nonlocalized. No fever, feels chilled. Last BM was this am and was NL.  She has urinary urgency but no other UTI signs or symptoms or hematuria.  Last menstrual period was 8/26-9/2 but she has continued to have spotting since that time.  LMP prior to that was normal, beginning of August.  No vaginal discharge.  , monogamous.  No other acute complaints or concerns.   ED providers Impression and Plan (applicable information) 27-year-old female with with acute pelvic pain and urinary urgency with extensive evaluation remarkable for right paraovarian cyst, left ovarian cyst and possible UTI.   Significant pain and nausea was difficult to control.  No other apparent emergent GI or  disease process on laboratory evaluation, CT scan and ultrasound evaluation.  She was treated prophylactically for PID with ceftriaxone and azithromycin.  Pain and nausea adequately controlled and she is comfortable with discharge home with her  with instructions for supportive care, rx for Cipro for possible UTI (urine culture pending), Zofran and Reglan for nausea, and Tramadol for pain refractory to Ibuprofen.  Patient is intolerant of opiate analgesics.  I recommend she follow up in primary care clinic for recheck later this week and that she get a follow-up ultrasound in the future.  She and her significant other expressed understanding of this. Patient was provided instructions for supportive care and will return as needed for worsened condition or worsening symptoms, or new problems or concerns.   Significant Medical hx, if applicable HSV, thyroid nodule, psoriasis   Coumadin/Warfarin [Yes /No] No   Creatinine Level (mg/dl) 0.48   Creatinine clearance (ml/min), if applicable 148.5   Pregnant (Yes/No/NA) No   Breastfeeding (Yes/No/NA) No   Allergies PCN's   Weight, if applicable 120# (2015)      RN Assessment (Patient s current Symptoms), include time called.  [Insert Left message here if message left]  Msg left with  at 11:42 am.      PCP follow-up Questions asked: NO    Jenniffer Weir RN    [facility] RN  Lung Nodule and ED Lab Results F/U RN  Epic pool (ED late result f/u RN) : [id number]  Ph # [phone]    Copy of Lab result   Order   Urine Culture [[id number]] (Order [id number])   Exam Information   Exam Date Exam Time Accession # Results    9/11/17  8:51 AM [id number]    Component Results   Component Collected Lab   Specimen Description 09/11/2017  8:51    Midstream Urine   Special Requests 09/11/2017  8:51 AM 75   Specimen received in preservative   Culture Micro (Abnormal)  09/11/2017  8:51    >100,000 colonies/mL   Beta hemolytic Streptococcus group B   Group B Streptococci are susceptible to ampicillin, penicillin, and cefazolin, but may be   erythromycin and/or clindamycin resistant. Contact Microbiology if your patient is   allergic to penicillin and you need erythromycin and/or clindamycin testing to be   performed.   Clindamycin and Erythromycin are not routinely prescribed for isolates from the urinary   tract.   Susceptibility testing must be requested within 5 days.   Beta Hemolytic Streptococcus groups A and B are susceptible to ampicillin, penicillin,   vancomycin, and the cephalosporins.  Susceptibility testing is not routinely done on these    organisms isolated from urine.      Culture Micro (Abnormal) 09/11/2017  8:51    10,000 to 50,000 colonies/mL   Escherichia coli      Culture & Susceptibility   ESCHERICHIA COLI   Antibiotic Interpretation Sensitivity Unit Method Status   AMPICILLIN Sensitive <=2 ug/mL DERRICK Final   AMPICILLIN/SULBACTAM Sensitive <=2 ug/mL DERRICK Final   CEFAZOLIN Sensitive <=4 ug/mL DERRICK Final   Comment: Cefazolin DERRICK breakpoints are for the treatment of uncomplicated urinary tract   infections.  For the treatment of systemic infections, please contact the   laboratory for additional testing.   CEFEPIME Sensitive <=1 ug/mL DERRICK Final   CEFOXITIN Sensitive <=4 ug/mL DERRICK Final   CEFTAZIDIME Sensitive <=1 ug/mL DERRICK Final   CEFTRIAXONE Sensitive <=1 ug/mL DERRICK Final   CIPROFLOXACIN Sensitive <=0.25 ug/mL DERRICK Final   GENTAMICIN Sensitive <=1 ug/mL DERRICK Final   LEVOFLOXACIN Sensitive <=0.12 ug/mL DERRICK Final   NITROFURANTOIN Sensitive <=16 ug/mL DERRICK Final   Piperacillin/Tazo Sensitive <=4 ug/mL DERRICK Final   TOBRAMYCIN Sensitive <=1 ug/mL DERRICK Final   Trimethoprim/Sulfa Sensitive <=1/19 ug/mL DERRICK Final

## 2017-09-15 RX ORDER — CEPHALEXIN 500 MG/1
500 CAPSULE ORAL 2 TIMES DAILY
Qty: 14 CAPSULE | Refills: 0 | Status: SHIPPED | OUTPATIENT
Start: 2017-09-15 | End: 2017-09-22

## 2017-09-15 NOTE — TELEPHONE ENCOUNTER
"[facility] Emergency Department Lab result notification     Patient/parent Name  Tiara Johnston RN Assessment (Patient s current Symptoms), include time called.  [Insert Left message here if message left]  Feeling \"better\", pain has improved.  Has not started taking Cipro due to side effects possible.  Did order Keflex 500 mg PO BID x 7 days per protocol.      Please Contact your PCP clinic or return to the Emergency department if your:    Symptoms return.    Symptoms do not improve after 3 days on antibiotic.    Symptoms do not resolve after completing antibiotic.    Symptoms worsen or other concerning symptom's.    PCP follow-up Questions asked: NO    Jenniffer Weir RN    [place] [employer] [facility] RN  Lung Nodule and ED Lab Results F/U RN  Epic pool (ED late result f/u RN) : P [id number]   # [phone]  "

## 2017-09-15 NOTE — TELEPHONE ENCOUNTER
Unable to reach via telephone so letter sent requesting a call back.    Wilner Norton, RN  [place] [employer] [facility] RN  Lung Nodule and ED Lab Result F/u RN  Epic pool (ED late result f/u RN): P [id number]  [facility] INCIDENTAL RADIOLOGY F/U NURSES: P [id number]  # [phone]

## 2018-04-11 LAB — TSH SERPL-ACNC: 0.28 UIU/ML (ref 0.35–4.94)

## 2018-04-16 LAB
ALT SERPL-CCNC: 29 IU/L
AST SERPL-CCNC: 25 U/L (ref 5–34)

## 2018-05-11 LAB — TSH SERPL-ACNC: 0.28 UIU/ML (ref 0.35–4.94)

## 2019-03-08 ENCOUNTER — TRANSFERRED RECORDS (OUTPATIENT)
Dept: HEALTH INFORMATION MANAGEMENT | Facility: CLINIC | Age: 29
End: 2019-03-08

## 2019-03-19 ENCOUNTER — HOSPITAL ENCOUNTER (EMERGENCY)
Facility: CLINIC | Age: 29
Discharge: HOME | End: 2019-03-19
Attending: FAMILY MEDICINE | Admitting: FAMILY MEDICINE
Payer: COMMERCIAL

## 2019-03-19 VITALS
BODY MASS INDEX: 22.6 KG/M2 | HEART RATE: 83 BPM | DIASTOLIC BLOOD PRESSURE: 70 MMHG | OXYGEN SATURATION: 100 % | WEIGHT: 140 LBS | SYSTOLIC BLOOD PRESSURE: 106 MMHG | TEMPERATURE: 97 F | RESPIRATION RATE: 16 BRPM

## 2019-03-19 DIAGNOSIS — D17.30 LIPOMA OF SKIN AND SUBCUTANEOUS TISSUE: ICD-10-CM

## 2019-03-19 PROCEDURE — 99283 EMERGENCY DEPT VISIT LOW MDM: CPT

## 2019-03-19 PROCEDURE — 99283 EMERGENCY DEPT VISIT LOW MDM: CPT | Mod: Z6 | Performed by: FAMILY MEDICINE

## 2019-03-19 NOTE — ED PROVIDER NOTES
HPI  Current medications, past medical history, and social history are reviewed.    The patient is a 28-year-old female presenting with concern for a lump in her right lower back.  She has been having some low back pain right greater than left over the past month or so.  She has difficulty lying on the right side.  Her  was rubbing her back a few days ago when he noticed a lump in the right lower region.  No overlying skin changes reported.  No fever or other systemic symptoms described.    ROS: All other review of systems are negative other than that noted above.      Past Medical History:   Diagnosis Date      delivery     placenta previa     Psoriasis      Past Surgical History:   Procedure Laterality Date     BACK SURGERY       C/SECTION, LOW TRANSVERSE  08    , Low Transverse     HEAD & NECK SURGERY       Medicines    metoclopramide (REGLAN) 10 MG tablet   ondansetron (ZOFRAN ODT) 4 MG ODT tab   traMADol (ULTRAM) 50 MG tablet     Family History   Problem Relation Age of Onset     Asthma Mother      Hypertension Mother      Thyroid Disease Mother      Hypertension Maternal Grandmother      Alzheimer Disease Maternal Grandfather      Asthma Brother      Asthma Brother      Asthma Son      Respiratory Son         sleep apnea     Asthma Daughter      Respiratory Daughter         sleep apnea     Unknown/Adopted Father      Unknown/Adopted Paternal Grandmother      Unknown/Adopted Paternal Grandfather      Social History     Tobacco Use     Smoking status: Never Smoker     Smokeless tobacco: Never Used   Substance Use Topics     Alcohol use: Yes     Comment: occassional; social     Drug use: No         PHYSICAL  /70   Pulse 83   Temp 97  F (36.1  C) (Temporal)   Resp 16   Wt 63.5 kg (140 lb)   SpO2 100%   BMI 22.60 kg/m    General: Patient is alert and in no distress.  Neurological: Alert.  Moving upper and lower extremities equally, bilaterally.  Head / Neck: Atraumatic.   Ears: Not done.  Eyes: Pupils are equal, round, and reactive.  Normal conjunctiva.  Nose: Midline.  No epistaxis.  Mouth / Throat:  Moist. Respiratory: No respiratory distress.  Cardiovascular: Regular rhythm.  Peripheral extremities are warm.  Abdomen / Pelvis: Not done.  Genitalia: Not done.  Musculoskeletal: Not done.  Skin: There is an easily palpable structure in the right lower back, within the subcutaneous tissue.  It is mobile.  It is tender.  No overlying skin changes.  Structures approximately 2 x 1 cm in surface area.      PHYSICIAN  0936.  The patient has a structure within the subcutaneous tissue that is visualized by ultrasound.  The history, physical exam, and ultrasound findings would most likely suggest a lipoma.  No or at least low evidence of infectious complication at this time.  Follow-up with dermatology or general surgery if it continues to bother.  Return here for worsening as discussed.    IMAGING  Images reviewed by me.  Radiology report also reviewed.  POC US SOFT TISSUE   Final Result   [facility] Procedure Note       Limited Bedside ED Ultrasound of Soft Tissue:      PROCEDURE: PERFORMED BY: Dr. Major Mandel   INDICATIONS/SYMPTOM: Skin redness, evaluate for abscess, cellulitis or foreign body   PROBE: High frequency linear probe   BODY LOCATION: Soft tissue located on back       FINDINGS: Cobblestoning of soft tissue: absent    Hypoechoic fluid (ie abscess) identified: absent        INTERPRETATION:  The soft tissue and muscle layers were evaluated.       Findings indicate a structure measuring about 1.5 x 1 cm.  There is a dense internal structure.  No surrounding inflammatory changes.      IMAGE DOCUMENTATION: Images were archived to hard drive.             IMPRESSION    ICD-10-CM    1. Lipoma of skin and subcutaneous tissue D17.30             Major Mandel MD  03/19/19 0993

## 2019-03-19 NOTE — ED NOTES
10 weeks pregnant with twins,      Pt reports when  was rubbing back on Saturday he noticed a lump on right lower back area,  Unable to see lump on pt's lower right back.   Pt denies any abnormal vaginal bleeding or discharge.

## 2019-03-19 NOTE — ED AVS SNAPSHOT
[facility] Emergency Department  [address] [address]  Phone:  [phone]  Fax:  [phone]                                    Tiara Johnston   MRN: [id number]    Department:  [facility] Emergency Department   Date of Visit:  3/19/2019           After Visit Summary Signature Page    I have received my discharge instructions, and my questions have been answered. I have discussed any challenges I see with this plan with the nurse or doctor.    ..........................................................................................................................................  Patient/Patient Representative Signature      ..........................................................................................................................................  Patient Representative Print Name and Relationship to Patient    ..................................................               ................................................  Date                                   Time    ..........................................................................................................................................  Reviewed by Signature/Title    ...................................................              ..............................................  Date                                               Time          22EPIC Rev 08/18

## 2019-03-25 LAB
HEP C HIM: NORMAL
HIV1+2 AB SERPL QL IA: NORMAL
TSH SERPL-ACNC: 0.01 UIU/ML (ref 0.35–4.94)

## 2019-04-08 ENCOUNTER — TRANSFERRED RECORDS (OUTPATIENT)
Dept: HEALTH INFORMATION MANAGEMENT | Facility: CLINIC | Age: 29
End: 2019-04-08

## 2019-04-08 LAB — TSH SERPL-ACNC: 0.01 UIU/ML (ref 0.35–4.94)

## 2019-05-09 ENCOUNTER — APPOINTMENT (OUTPATIENT)
Dept: OBGYN | Facility: CLINIC | Age: 29
End: 2019-05-09

## 2019-05-09 ENCOUNTER — PRENATAL OFFICE VISIT (OUTPATIENT)
Dept: OBGYN | Facility: CLINIC | Age: 29
End: 2019-05-09

## 2019-05-09 DIAGNOSIS — Z34.80 PRENATAL CARE, SUBSEQUENT PREGNANCY: ICD-10-CM

## 2019-05-09 PROCEDURE — 99207 ZZC NO CHARGE NURSE ONLY: CPT | Performed by: OBSTETRICS & GYNECOLOGY

## 2019-05-09 NOTE — PROGRESS NOTES
Patient is transfer from [facility]-she is expecting twins  Sana Tucker OB Intake Nurse

## 2019-05-13 ENCOUNTER — PRENATAL OFFICE VISIT (OUTPATIENT)
Dept: OBGYN | Facility: CLINIC | Age: 29
End: 2019-05-13
Payer: MEDICAID

## 2019-05-13 VITALS
BODY MASS INDEX: 23.73 KG/M2 | SYSTOLIC BLOOD PRESSURE: 103 MMHG | WEIGHT: 147 LBS | HEART RATE: 89 BPM | TEMPERATURE: 97.9 F | DIASTOLIC BLOOD PRESSURE: 71 MMHG

## 2019-05-13 DIAGNOSIS — Z34.92 PRENATAL CARE IN SECOND TRIMESTER: Primary | ICD-10-CM

## 2019-05-13 DIAGNOSIS — O30.042 DICHORIONIC DIAMNIOTIC TWIN PREGNANCY IN SECOND TRIMESTER: Primary | ICD-10-CM

## 2019-05-13 DIAGNOSIS — O21.9 NAUSEA AND VOMITING OF PREGNANCY, ANTEPARTUM: ICD-10-CM

## 2019-05-13 DIAGNOSIS — K21.9 GASTROESOPHAGEAL REFLUX DISEASE WITHOUT ESOPHAGITIS: ICD-10-CM

## 2019-05-13 LAB
T3 SERPL-MCNC: 238 NG/DL (ref 60–181)
T4 FREE SERPL-MCNC: 1.09 NG/DL (ref 0.76–1.46)
TSH SERPL DL<=0.005 MIU/L-ACNC: 0.04 MU/L (ref 0.4–4)

## 2019-05-13 PROCEDURE — 76815 OB US LIMITED FETUS(S): CPT | Performed by: OBSTETRICS & GYNECOLOGY

## 2019-05-13 PROCEDURE — 84480 ASSAY TRIIODOTHYRONINE (T3): CPT | Performed by: OBSTETRICS & GYNECOLOGY

## 2019-05-13 PROCEDURE — 84443 ASSAY THYROID STIM HORMONE: CPT | Performed by: OBSTETRICS & GYNECOLOGY

## 2019-05-13 PROCEDURE — 84439 ASSAY OF FREE THYROXINE: CPT | Performed by: OBSTETRICS & GYNECOLOGY

## 2019-05-13 PROCEDURE — 99213 OFFICE O/P EST LOW 20 MIN: CPT | Mod: 25 | Performed by: OBSTETRICS & GYNECOLOGY

## 2019-05-13 PROCEDURE — 36415 COLL VENOUS BLD VENIPUNCTURE: CPT | Performed by: OBSTETRICS & GYNECOLOGY

## 2019-05-13 RX ORDER — PROCHLORPERAZINE MALEATE 10 MG
10 TABLET ORAL EVERY 6 HOURS PRN
Qty: 30 TABLET | Refills: 1 | Status: SHIPPED | OUTPATIENT
Start: 2019-05-13 | End: 2019-06-10

## 2019-05-13 RX ORDER — CALCIUM CARBONATE 500 MG/1
1 TABLET, CHEWABLE ORAL 2 TIMES DAILY
Qty: 60 TABLET | Refills: 1 | Status: SHIPPED | OUTPATIENT
Start: 2019-05-13 | End: 2019-06-10

## 2019-05-13 NOTE — PROGRESS NOTES
Tiara is a 28 year old  @ 17w6d weeks by LMP  8w2d US with di-di twins; Here for transfer of care visit.   Had established care with [facility].     She had a ultrasound done on 3/6 which showed dichorionic diamniotic twin intrauterine gestation with twin A at 8w2d and twin B at 8w1d. IVANA 10/14/2019  She was also seen in the [facility] at 3/19 for a lump on her back; diagnosis was determined to be a lipoma.    Complains of nausea that has not been well addressed with Zofran PO, Reglan PO; mainly due to side effects from the medications.   She also complains of acid reflux and wonders what she can take.     Patient does acknowledge a history of thyroid nodule which was biopsied and benign about a year ago.     ROS: Ten point review of systems was reviewed and negative except the above.      OBhx:  x 1  C-sec x 3  Gyne: Denies hx of STIs and abnormal Pap smears; last Pap smear (according to Care Everywhere was 2018 which is presumed to be NIL as per patient )       Past Medical History:   Diagnosis Date     Chickenpox       delivery     placenta previa     Psoriasis      Past Surgical History:   Procedure Laterality Date     C/SECTION, LOW TRANSVERSE  08,4/15/10,11    , Low Transverse     HEAD & NECK SURGERY      thyroid biopsy     Patient Active Problem List    Diagnosis Date Noted     Prenatal care, subsequent pregnancy 2019     Priority: Medium     Subclinical hyperthyroidism 2014     Priority: Medium     Guttate psoriasis 2014     Priority: Medium     Evaluated by Derm in 2013 and given treated with Lidex BID.        CARDIOVASCULAR SCREENING; LDL GOAL LESS THAN 160 2013     Priority: Medium     HSV-1 infection 2011     Priority: Medium     Per serum STD screening.       Thyroid nodule 2011     Priority: Medium     Right lobe non-tender  Thyroid US with FNA in 2011 benign.   TSH   Date Value Range Status   3/28/2014 0.45  0.4  - 5.0 mU/L Final   2011 0.67  0.4 - 5.0 mU/L Final   2011 0.25* 0.4 - 5.0 mU/L Final   3/21/2011 0.15* 0.4 - 5.0 mU/L Final            Contraception 2011     Priority: Medium     No current contraception. Reporting irregular periods 2014.        H/O:  section 2009     Priority: Medium     Class: Chronic        Allergies   Allergen Reactions     Penicillins Rash       No current outpatient medications on file prior to visit.  No current facility-administered medications on file prior to visit.     Past Medical History of Father of Baby:   No significant medical history    Physical Exam: /71 (BP Location: Left arm, Patient Position: Chair, Cuff Size: Adult Regular)   Pulse 89   Temp 97.9  F (36.6  C) (Tympanic)   Wt 66.7 kg (147 lb)   LMP 2019   BMI 23.73 kg/m    General: Well developed, well nourished female  Skin: No lesions, Warm, moist and No cyanosis or pallor  HEENT: Atraumatic, normocephali  Neck: Supple,no adenopathy,thyroid normal  Chest: Clear to auscultation  Heart: Regular rate, rhythm  Breasts: deferred   Abdomen: Soft, flat, non-tender   Extremities: No cyanosis, clubbing, warm and well perfused and No edema  Neurological: Mental Status Normal and Station and Gait Normal   Perineum: Deferred   Vulva: Deferred  Vagina: Deferred  Cervix: Deferred  Uterus: 20 weeks fundal height   Adnexa: Deferred  Rectum: deferred.   Bony Pelvis: Not applicable given history of C-sections      Transabdominal ultrasound was performed today.   A di-di twin gestation was seen. Baby A fetal cardiac activity at 167 bpm. Baby B feta cardiac activity at 167 bpm.         Reviewed new OB prenatal labs. Rh positive status. Low TSH, elevated free T3, normal T4  Reviewed dating ultrasound. All records on Care Everywhere.       A/P 28 year old  at  18w0d by 8w2d    1. Discussed physician coverage, tertiary support, diet, exercise, weight gain, schedule of visits, routine and  indicated ultrasounds, and childbirth education.    2. Di-di twins  -- has a level 2 ultrasound already scheduled at [place] on   -- reviewed q4 week growth ultrasound, and ongoing  fetal US surveillance starting at 28-30w with weekly BPPs  -- also reviewed that full term for di-di twins at 38w    3. Hx of   -- discussed scheduling repeat  at 38 weeks    4. Desires sterilization  -- will inquire about medical insurance coverage to determine if she needs to sign federal tubal papers.      5. Low TSH, elevated T3, normal T4: labs repeated     6. Prenatal Vitamins encouraged. Compazine PO prescribed for nausea in pregnancy; prescribed TUMS for acid reflux    7. RTC in 4 weeks. SAB precautions reviewed    Omi Fonseca MD  [facility]

## 2019-05-13 NOTE — NURSING NOTE
"Initial /71 (BP Location: Left arm, Patient Position: Chair, Cuff Size: Adult Regular)   Pulse 89   Temp 97.9  F (36.6  C) (Tympanic)   Wt 66.7 kg (147 lb)   LMP 01/08/2019   BMI 23.73 kg/m   Estimated body mass index is 23.73 kg/m  as calculated from the following:    Height as of 11/18/15: 1.676 m (5' 6\").    Weight as of this encounter: 66.7 kg (147 lb). .    Krystle Mckeon    "

## 2019-05-14 NOTE — RESULT ENCOUNTER NOTE
Subclinical hyperthyroidism remains persistent (previous labs were drawn at [facility]; refer to Care Everywhere records). Patient is asymptomatic.   Likely pregnancy-related physiological change especially given current twin gestation pregnancy.     Omi Fonseca MD  [facility]

## 2019-06-10 ENCOUNTER — PRENATAL OFFICE VISIT (OUTPATIENT)
Dept: OBGYN | Facility: CLINIC | Age: 29
End: 2019-06-10

## 2019-06-10 VITALS
WEIGHT: 157 LBS | BODY MASS INDEX: 25.34 KG/M2 | HEART RATE: 76 BPM | TEMPERATURE: 97.4 F | DIASTOLIC BLOOD PRESSURE: 71 MMHG | SYSTOLIC BLOOD PRESSURE: 102 MMHG

## 2019-06-10 DIAGNOSIS — Z98.891 HISTORY OF C-SECTION: ICD-10-CM

## 2019-06-10 DIAGNOSIS — O30.042 DICHORIONIC DIAMNIOTIC TWIN PREGNANCY IN SECOND TRIMESTER: Primary | ICD-10-CM

## 2019-06-10 PROCEDURE — 99207 ZZC PRENATAL VISIT: CPT | Performed by: OBSTETRICS & GYNECOLOGY

## 2019-06-10 NOTE — PROGRESS NOTES
Doing well.   Reports mild discomfort from varicose veins in her lower extremities.   Denies VB, ctx, LOF. +FM  /71 (BP Location: Left arm, Patient Position: Chair, Cuff Size: Adult Regular)   Pulse 76   Temp 97.4  F (36.3  C) (Tympanic)   Wt 71.2 kg (157 lb)   LMP 2019   BMI 25.34 kg/m    General Appearance: NAD  Abdomen: Gravid, NT  Refer to flow sheet above.   A/P: 28 year old  at 21w6d with di-di twins  -- level 2 fetal anatomy US report reviewed from  in [place] with MPP; recommend follow-up growth US with our [facility] providers   -- reviewed thyroid labs consistent with subclinical hyperthyroidism; will continue to monitor  -- hx of  x 3; plan for repeat  at 38w; inquired about insurance coverage for desired sterilization; patient conveyed MA coverage; federal tubal papers signed today  -- 1 hr GCT, CBC and syphilis testing ordered  -- PTL precautions reviewed  RTC in 4 weeks    Omi Fonseca MD  [facility]

## 2019-06-10 NOTE — NURSING NOTE
"Initial /71 (BP Location: Left arm, Patient Position: Chair, Cuff Size: Adult Regular)   Pulse 76   Temp 97.4  F (36.3  C) (Tympanic)   Wt 71.2 kg (157 lb)   LMP 01/08/2019   BMI 25.34 kg/m   Estimated body mass index is 25.34 kg/m  as calculated from the following:    Height as of 11/18/15: 1.676 m (5' 6\").    Weight as of this encounter: 71.2 kg (157 lb). .    Krystle Mckeon    "

## 2019-06-21 ENCOUNTER — AMBULATORY - HEALTHEAST (OUTPATIENT)
Dept: MATERNAL FETAL MEDICINE | Facility: HOSPITAL | Age: 29
End: 2019-06-21

## 2019-06-21 DIAGNOSIS — O26.90 PREGNANCY, ANTEPARTUM, COMPLICATIONS: ICD-10-CM

## 2019-06-24 ENCOUNTER — OFFICE VISIT - HEALTHEAST (OUTPATIENT)
Dept: MATERNAL FETAL MEDICINE | Facility: HOSPITAL | Age: 29
End: 2019-06-24

## 2019-06-24 ENCOUNTER — RECORDS - HEALTHEAST (OUTPATIENT)
Dept: ADMINISTRATIVE | Facility: OTHER | Age: 29
End: 2019-06-24

## 2019-06-24 ENCOUNTER — AMBULATORY - HEALTHEAST (OUTPATIENT)
Dept: MATERNAL FETAL MEDICINE | Facility: HOSPITAL | Age: 29
End: 2019-06-24

## 2019-06-24 ENCOUNTER — RECORDS - HEALTHEAST (OUTPATIENT)
Dept: ULTRASOUND IMAGING | Facility: HOSPITAL | Age: 29
End: 2019-06-24

## 2019-06-24 DIAGNOSIS — O30.009 DISCORDANT FETAL GROWTH IN TWIN GESTATION, FETUS 2 OF MULTIPLE GESTATION: ICD-10-CM

## 2019-06-24 DIAGNOSIS — O30.042 DICHORIONIC DIAMNIOTIC TWIN PREGNANCY IN SECOND TRIMESTER: ICD-10-CM

## 2019-06-24 DIAGNOSIS — O28.3 ABNORMAL PRENATAL ULTRASOUND: ICD-10-CM

## 2019-06-24 DIAGNOSIS — O26.90 PREGNANCY RELATED CONDITIONS, UNSPECIFIED, UNSPECIFIED TRIMESTER: ICD-10-CM

## 2019-06-24 DIAGNOSIS — O09.899 SINGLE UMBILICAL ARTERY, MATERNAL, ANTEPARTUM: ICD-10-CM

## 2019-06-24 DIAGNOSIS — O09.899 TWO VESSEL UMBILICAL CORD, ANTEPARTUM: Primary | ICD-10-CM

## 2019-06-24 DIAGNOSIS — O36.5992 DISCORDANT FETAL GROWTH IN TWIN GESTATION, FETUS 2 OF MULTIPLE GESTATION: ICD-10-CM

## 2019-06-24 DIAGNOSIS — O28.3 ABNORMAL FETAL ULTRASOUND: ICD-10-CM

## 2019-06-24 DIAGNOSIS — O35.10X2 SUSPECTED CHROMOSOME ANOMALY OF FETUS AFFECTING MANAGEMENT OF MOTHER, ANTEPARTUM, FETUS 2 OF MULTIPLE GESTATION: ICD-10-CM

## 2019-06-26 LAB
CMV IGG SERPL IA-ACNC: <0.2 AI (ref 0–0.8)
CMV IGM SERPL IA-ACNC: 0.2 AI (ref 0–0.8)
HSV1 IGG SERPL QL IA: POSITIVE
HSV2 IGG SERPL QL IA: NEGATIVE

## 2019-06-27 LAB — HSV IGM ANTIBODY: 1.2 INDEX VALUE (ref 0–0.89)

## 2019-06-28 ENCOUNTER — AMBULATORY - HEALTHEAST (OUTPATIENT)
Dept: MATERNAL FETAL MEDICINE | Facility: HOSPITAL | Age: 29
End: 2019-06-28

## 2019-07-01 ENCOUNTER — COMMUNICATION - HEALTHEAST (OUTPATIENT)
Dept: MATERNAL FETAL MEDICINE | Facility: HOSPITAL | Age: 29
End: 2019-07-01

## 2019-07-01 ENCOUNTER — RECORDS - HEALTHEAST (OUTPATIENT)
Dept: ADMINISTRATIVE | Facility: OTHER | Age: 29
End: 2019-07-01

## 2019-07-01 ENCOUNTER — RECORDS - HEALTHEAST (OUTPATIENT)
Dept: ULTRASOUND IMAGING | Facility: HOSPITAL | Age: 29
End: 2019-07-01

## 2019-07-01 ENCOUNTER — OFFICE VISIT - HEALTHEAST (OUTPATIENT)
Dept: MATERNAL FETAL MEDICINE | Facility: HOSPITAL | Age: 29
End: 2019-07-01

## 2019-07-01 DIAGNOSIS — O36.5992: ICD-10-CM

## 2019-07-01 DIAGNOSIS — O30.042 TWIN PREGNANCY, DICHORIONIC/DIAMNIOTIC, SECOND TRIMESTER: ICD-10-CM

## 2019-07-01 DIAGNOSIS — O30.009 TWIN PREGNANCY, UNSPECIFIED NUMBER OF PLACENTA AND UNSPECIFIED NUMBER OF AMNIOTIC SACS, UNSPECIFIED TRIMESTER: ICD-10-CM

## 2019-07-01 DIAGNOSIS — O30.042 DICHORIONIC DIAMNIOTIC TWIN PREGNANCY IN SECOND TRIMESTER: ICD-10-CM

## 2019-07-01 LAB — TRISOMY 21,18,13 - HE HISTORICAL: NORMAL

## 2019-07-05 ENCOUNTER — DOCUMENTATION ONLY (OUTPATIENT)
Dept: OBGYN | Facility: CLINIC | Age: 29
End: 2019-07-05

## 2019-07-05 DIAGNOSIS — Z34.82 PRENATAL CARE, SUBSEQUENT PREGNANCY IN SECOND TRIMESTER: Primary | ICD-10-CM

## 2019-07-05 LAB — Lab: NORMAL

## 2019-07-08 ENCOUNTER — PRENATAL OFFICE VISIT (OUTPATIENT)
Dept: OBGYN | Facility: CLINIC | Age: 29
End: 2019-07-08
Payer: COMMERCIAL

## 2019-07-08 VITALS
HEART RATE: 74 BPM | WEIGHT: 161 LBS | TEMPERATURE: 97.2 F | DIASTOLIC BLOOD PRESSURE: 72 MMHG | SYSTOLIC BLOOD PRESSURE: 108 MMHG | BODY MASS INDEX: 25.99 KG/M2

## 2019-07-08 DIAGNOSIS — Z34.82 PRENATAL CARE, SUBSEQUENT PREGNANCY IN SECOND TRIMESTER: Primary | ICD-10-CM

## 2019-07-08 DIAGNOSIS — Z34.82 PRENATAL CARE, SUBSEQUENT PREGNANCY IN SECOND TRIMESTER: ICD-10-CM

## 2019-07-08 LAB
ERYTHROCYTE [DISTWIDTH] IN BLOOD BY AUTOMATED COUNT: 13.2 % (ref 10–15)
GLUCOSE 1H P 50 G GLC PO SERPL-MCNC: 83 MG/DL (ref 60–129)
HCT VFR BLD AUTO: 33.2 % (ref 35–47)
HGB BLD-MCNC: 11 G/DL (ref 11.7–15.7)
MCH RBC QN AUTO: 27.1 PG (ref 26.5–33)
MCHC RBC AUTO-ENTMCNC: 33.1 G/DL (ref 31.5–36.5)
MCV RBC AUTO: 82 FL (ref 78–100)
PLATELET # BLD AUTO: 291 10E9/L (ref 150–450)
RBC # BLD AUTO: 4.06 10E12/L (ref 3.8–5.2)
WBC # BLD AUTO: 8.8 10E9/L (ref 4–11)

## 2019-07-08 PROCEDURE — 82950 GLUCOSE TEST: CPT | Performed by: OBSTETRICS & GYNECOLOGY

## 2019-07-08 PROCEDURE — 36415 COLL VENOUS BLD VENIPUNCTURE: CPT | Performed by: OBSTETRICS & GYNECOLOGY

## 2019-07-08 PROCEDURE — 86780 TREPONEMA PALLIDUM: CPT | Performed by: OBSTETRICS & GYNECOLOGY

## 2019-07-08 PROCEDURE — 85027 COMPLETE CBC AUTOMATED: CPT | Performed by: OBSTETRICS & GYNECOLOGY

## 2019-07-08 PROCEDURE — 99207 ZZC PRENATAL VISIT: CPT | Performed by: OBSTETRICS & GYNECOLOGY

## 2019-07-08 NOTE — LETTER
[facility]  [address] [id number]  [phone]          July 8, 2019    RE:  Tiara Johnston                                                                                                                                                       [address] [address]            To whom it may concern:    Tiara Johnston is under my professional care for Prenatal care, subsequent pregnancy in second trimester She  may return to work with the following: The employee is ABLE to return to work today.    When the patient returns to work, the following restrictions apply until maternity leave :     A.  Patient is to work no longer than a 5 hour shift at this time.      Sincerely,        Omi Fonseca MD

## 2019-07-08 NOTE — PROGRESS NOTES
Doing well.   Reports worsening lower extremity edema especially towards the end of the day when she is on her feet for most of the day; requesting a letter to lessen her shift hours at work.   Denies VB, ctx, LOF. +FM  /72 (BP Location: Right arm, Patient Position: Chair, Cuff Size: Adult Regular)   Pulse 74   Temp 97.2  F (36.2  C) (Tympanic)   Wt 73 kg (161 lb)   LMP 2019   Breastfeeding? No   BMI 25.99 kg/m    General Appearance: NAD  Abdomen: Gravid, NT  Refer to flow sheet above.   A/P: 28 year old  at 25w6d with di-di twin gestation   -- di-di twin gestation: IUGR for baby B with weekly fetal US surveillance including umbilical artery dopplers; has upcoming fetal echos at [facility]  -- hx of  section with plans for repeat  section   -- 1 hr GCT, CBC and syphilis testing  -- PTL precautions reviewed  RTC in 4 weeks  Omi Fonseca MD  [facility]

## 2019-07-09 LAB — T PALLIDUM AB SER QL: NONREACTIVE

## 2019-07-10 ENCOUNTER — RECORDS - HEALTHEAST (OUTPATIENT)
Dept: ADMINISTRATIVE | Facility: OTHER | Age: 29
End: 2019-07-10

## 2019-07-10 ENCOUNTER — OFFICE VISIT - HEALTHEAST (OUTPATIENT)
Dept: MATERNAL FETAL MEDICINE | Facility: HOSPITAL | Age: 29
End: 2019-07-10

## 2019-07-10 ENCOUNTER — RECORDS - HEALTHEAST (OUTPATIENT)
Dept: ULTRASOUND IMAGING | Facility: HOSPITAL | Age: 29
End: 2019-07-10

## 2019-07-10 DIAGNOSIS — O30.009 TWIN PREGNANCY, UNSPECIFIED NUMBER OF PLACENTA AND UNSPECIFIED NUMBER OF AMNIOTIC SACS, UNSPECIFIED TRIMESTER: ICD-10-CM

## 2019-07-10 DIAGNOSIS — O30.009 DISCORDANT FETAL GROWTH IN TWIN GESTATION, FETUS 2 OF MULTIPLE GESTATION: ICD-10-CM

## 2019-07-10 DIAGNOSIS — O36.5992: ICD-10-CM

## 2019-07-10 DIAGNOSIS — O30.042 TWIN PREGNANCY, DICHORIONIC/DIAMNIOTIC, SECOND TRIMESTER: ICD-10-CM

## 2019-07-10 DIAGNOSIS — O36.8390 MATERNAL CARE FOR FETAL TACHYCARDIA DURING PREGNANCY: ICD-10-CM

## 2019-07-10 DIAGNOSIS — O36.5992 DISCORDANT FETAL GROWTH IN TWIN GESTATION, FETUS 2 OF MULTIPLE GESTATION: ICD-10-CM

## 2019-07-15 ENCOUNTER — OFFICE VISIT - HEALTHEAST (OUTPATIENT)
Dept: MATERNAL FETAL MEDICINE | Facility: HOSPITAL | Age: 29
End: 2019-07-15

## 2019-07-15 ENCOUNTER — RECORDS - HEALTHEAST (OUTPATIENT)
Dept: ULTRASOUND IMAGING | Facility: HOSPITAL | Age: 29
End: 2019-07-15

## 2019-07-15 ENCOUNTER — RECORDS - HEALTHEAST (OUTPATIENT)
Dept: ADMINISTRATIVE | Facility: OTHER | Age: 29
End: 2019-07-15

## 2019-07-15 DIAGNOSIS — O30.009 TWIN PREGNANCY, UNSPECIFIED NUMBER OF PLACENTA AND UNSPECIFIED NUMBER OF AMNIOTIC SACS, UNSPECIFIED TRIMESTER: ICD-10-CM

## 2019-07-15 DIAGNOSIS — O30.009 DISCORDANT FETAL GROWTH IN TWIN GESTATION, FETUS 2 OF MULTIPLE GESTATION: ICD-10-CM

## 2019-07-15 DIAGNOSIS — O30.042 DICHORIONIC DIAMNIOTIC TWIN PREGNANCY IN SECOND TRIMESTER: ICD-10-CM

## 2019-07-15 DIAGNOSIS — O36.5992: ICD-10-CM

## 2019-07-15 DIAGNOSIS — O36.5992 DISCORDANT FETAL GROWTH IN TWIN GESTATION, FETUS 2 OF MULTIPLE GESTATION: ICD-10-CM

## 2019-07-15 DIAGNOSIS — O30.042 TWIN PREGNANCY, DICHORIONIC/DIAMNIOTIC, SECOND TRIMESTER: ICD-10-CM

## 2019-07-18 ENCOUNTER — HOSPITAL ENCOUNTER (OUTPATIENT)
Dept: CARDIOLOGY | Facility: CLINIC | Age: 29
End: 2019-07-18
Attending: OBSTETRICS & GYNECOLOGY
Payer: COMMERCIAL

## 2019-07-18 ENCOUNTER — HOSPITAL ENCOUNTER (OUTPATIENT)
Dept: CARDIOLOGY | Facility: CLINIC | Age: 29
Discharge: HOME | End: 2019-07-18
Attending: OBSTETRICS & GYNECOLOGY | Admitting: OBSTETRICS & GYNECOLOGY
Payer: COMMERCIAL

## 2019-07-18 DIAGNOSIS — O28.3 ABNORMAL FETAL ULTRASOUND: ICD-10-CM

## 2019-07-18 DIAGNOSIS — O09.899 TWO VESSEL UMBILICAL CORD, ANTEPARTUM: ICD-10-CM

## 2019-07-18 DIAGNOSIS — R12 HEART BURN: Primary | ICD-10-CM

## 2019-07-18 PROCEDURE — 76825 ECHO EXAM OF FETAL HEART: CPT

## 2019-07-18 PROCEDURE — 76825 ECHO EXAM OF FETAL HEART: CPT | Mod: 59

## 2019-07-18 NOTE — TELEPHONE ENCOUNTER
Patient looking for a prescription for TUMS.  Please review and advise.  Thank you.    Johnna Taveras   [facility]  [place]

## 2019-07-19 RX ORDER — CALCIUM CARBONATE 500 MG/1
1 TABLET, CHEWABLE ORAL 2 TIMES DAILY
Qty: 30 TABLET | Refills: 1 | Status: SHIPPED | OUTPATIENT
Start: 2019-07-19

## 2019-07-22 ENCOUNTER — TELEPHONE (OUTPATIENT)
Dept: OBGYN | Facility: CLINIC | Age: 29
End: 2019-07-22

## 2019-07-22 ENCOUNTER — OFFICE VISIT - HEALTHEAST (OUTPATIENT)
Dept: MATERNAL FETAL MEDICINE | Facility: HOSPITAL | Age: 29
End: 2019-07-22

## 2019-07-22 ENCOUNTER — RECORDS - HEALTHEAST (OUTPATIENT)
Dept: ULTRASOUND IMAGING | Facility: HOSPITAL | Age: 29
End: 2019-07-22

## 2019-07-22 ENCOUNTER — RECORDS - HEALTHEAST (OUTPATIENT)
Dept: ADMINISTRATIVE | Facility: OTHER | Age: 29
End: 2019-07-22

## 2019-07-22 DIAGNOSIS — O30.049 DICHORIONIC DIAMNIOTIC TWIN PREGNANCY: Primary | ICD-10-CM

## 2019-07-22 DIAGNOSIS — O30.042 TWIN PREGNANCY, DICHORIONIC/DIAMNIOTIC, SECOND TRIMESTER: ICD-10-CM

## 2019-07-22 DIAGNOSIS — O28.3 ABNORMAL PRENATAL ULTRASOUND: ICD-10-CM

## 2019-07-22 DIAGNOSIS — O36.5912: ICD-10-CM

## 2019-07-22 NOTE — TELEPHONE ENCOUNTER
Reason for Call:  Other     Detailed comments: Pt [facility] in [place] today for an ultrasound. Was told to call the [place] [facility] to schedule Doppler by 07/26 because blood flow was a little high for Baby B (pt prefers to have it here if possible because the location is more convenient for her) - Please advise    Phone Number Patient can be reached at: Home number on file [phone] (home)    Best Time: Any    Can we leave a detailed message on this number? YES    Call taken on 7/22/2019 at 3:21 PM by Denise Behrendt

## 2019-07-22 NOTE — TELEPHONE ENCOUNTER
Pt was informed that Bpp with UAR Dopplers can be done here at the [facility] department.  Order has been placed.    Toyin Phillips  [facility] RN

## 2019-07-25 ENCOUNTER — HOSPITAL ENCOUNTER (OUTPATIENT)
Dept: ULTRASOUND IMAGING | Facility: CLINIC | Age: 29
Discharge: HOME | End: 2019-07-25
Attending: OBSTETRICS & GYNECOLOGY | Admitting: OBSTETRICS & GYNECOLOGY
Payer: COMMERCIAL

## 2019-07-25 DIAGNOSIS — O30.049 DICHORIONIC DIAMNIOTIC TWIN PREGNANCY: ICD-10-CM

## 2019-07-25 PROCEDURE — 76819 FETAL BIOPHYS PROFIL W/O NST: CPT

## 2019-07-26 ENCOUNTER — TELEPHONE (OUTPATIENT)
Dept: OBGYN | Facility: CLINIC | Age: 29
End: 2019-07-26

## 2019-07-26 NOTE — TELEPHONE ENCOUNTER
Reason for Call:  Request for results:    Name of test or procedure: OB ultrasound    Date of test of procedure: 07/25/2019    Location of the test or procedure: [facility]    OK to leave the result message on voice mail or with a family member? YES    Phone number Patient can be reached at:  Home number on file [phone] (home)    Additional comments: NA    Call taken on 7/26/2019 at 9:12 AM by Denise Behrendt

## 2019-07-26 NOTE — TELEPHONE ENCOUNTER
I have reviewed the BPP for her baby b of her twins and the BPP was normal with score of 8/8. The umbilical doppler for baby B was normal as well.     Please inform patient of these results.   Thanks.    Omi Fonseca MD  [facility]

## 2019-07-29 ENCOUNTER — OFFICE VISIT - HEALTHEAST (OUTPATIENT)
Dept: MATERNAL FETAL MEDICINE | Facility: HOSPITAL | Age: 29
End: 2019-07-29

## 2019-07-29 ENCOUNTER — RECORDS - HEALTHEAST (OUTPATIENT)
Dept: ADMINISTRATIVE | Facility: OTHER | Age: 29
End: 2019-07-29

## 2019-07-29 ENCOUNTER — RECORDS - HEALTHEAST (OUTPATIENT)
Dept: ULTRASOUND IMAGING | Facility: HOSPITAL | Age: 29
End: 2019-07-29

## 2019-07-29 DIAGNOSIS — O36.5912: ICD-10-CM

## 2019-07-29 DIAGNOSIS — O30.042 DICHORIONIC DIAMNIOTIC TWIN PREGNANCY IN SECOND TRIMESTER: ICD-10-CM

## 2019-07-29 DIAGNOSIS — O30.042 TWIN PREGNANCY, DICHORIONIC/DIAMNIOTIC, SECOND TRIMESTER: ICD-10-CM

## 2019-08-02 ENCOUNTER — TELEPHONE (OUTPATIENT)
Dept: OBGYN | Facility: CLINIC | Age: 29
End: 2019-08-02

## 2019-08-02 NOTE — TELEPHONE ENCOUNTER
I would say in light of how high risk her pregnancy is especially with her baby B being growth restricted and demonstrating umbilical artery doppler readings elevated on baby B as well, that it would be in her best interest to have them done with MFM.   I just dont want her to receive the most optimal care she can given the expertise of MFM in managing these kind of high risk pregnancies that she may not get here in [place] in regards to ultrasound assessment.     Omi Fonseca Md  [facility]

## 2019-08-02 NOTE — TELEPHONE ENCOUNTER
Please review and advise.    Would you be able to order the recommended imaging studies from [facility] to here so patient does not have to drive to the cities?    Please review and advise.  Thank you.    Johnna Taveras   [facility]  RN

## 2019-08-02 NOTE — TELEPHONE ENCOUNTER
Reason for Call:  Other     Detailed comments: EDC: 10/15/2019    Pt has a few questions regarding ultrasounds she has scheduled in the cities - Would like to know if she can get these done here (BPP ultrasounds; measurements once a month) - Please advise    Phone Number Patient can be reached at: [phone] ('s, Mian, cell)    Best Time: Any    Can we leave a detailed message on this number? YES or can speak to Mian    Call taken on 8/2/2019 at 10:29 AM by Denise Behrendt

## 2019-08-02 NOTE — TELEPHONE ENCOUNTER
Pt notified of below.  Pt reports understanding.  Pt does not have further questions or concerns.    Johnna Taveras   [facility]  RN

## 2019-08-05 ENCOUNTER — RECORDS - HEALTHEAST (OUTPATIENT)
Dept: ULTRASOUND IMAGING | Facility: HOSPITAL | Age: 29
End: 2019-08-05

## 2019-08-05 ENCOUNTER — RECORDS - HEALTHEAST (OUTPATIENT)
Dept: ADMINISTRATIVE | Facility: OTHER | Age: 29
End: 2019-08-05

## 2019-08-05 ENCOUNTER — OFFICE VISIT - HEALTHEAST (OUTPATIENT)
Dept: MATERNAL FETAL MEDICINE | Facility: HOSPITAL | Age: 29
End: 2019-08-05

## 2019-08-05 DIAGNOSIS — O36.5992 POOR FETAL GROWTH AFFECTING MANAGEMENT OF MOTHER, ANTEPARTUM, FETUS 2 OF MULTIPLE GESTATION: ICD-10-CM

## 2019-08-05 DIAGNOSIS — O30.043 DICHORIONIC DIAMNIOTIC TWIN PREGNANCY IN THIRD TRIMESTER: ICD-10-CM

## 2019-08-05 DIAGNOSIS — O30.042 TWIN PREGNANCY, DICHORIONIC/DIAMNIOTIC, SECOND TRIMESTER: ICD-10-CM

## 2019-08-08 ENCOUNTER — TELEPHONE (OUTPATIENT)
Dept: OBGYN | Facility: CLINIC | Age: 29
End: 2019-08-08

## 2019-08-08 DIAGNOSIS — O30.043 DICHORIONIC DIAMNIOTIC TWIN PREGNANCY IN THIRD TRIMESTER: Primary | ICD-10-CM

## 2019-08-08 NOTE — TELEPHONE ENCOUNTER
Reason for Call: Request for an order or referral:    Order or referral being requested: BPP    Date needed: by next week    Has the patient been seen by the PCP for this problem? NO    Additional comments: EDC 10-1-19- was just seen on Mon at [facility].  Has been doing the BPP's at [facility] every Monday - pt is calling to ask for orders to do them here at [place]    Phone number Patient can be reached at:  Home number on file [phone] (home)    Best Time:  any    Can we leave a detailed message on this number?  YES    Call taken on 8/8/2019 at 10:06 AM by Geraldine Toro

## 2019-08-08 NOTE — TELEPHONE ENCOUNTER
Please review and advise.  Do not see OFFICE VISIT from [facility] that patient is referring to or BPP results.    Please see telephone encounter from 8/2/19. Dr. Fonseca recommended patient continue BPP's at [facility] due to high risk nature of pregnancy.    Patient did complete BPP with UAR dopplers here on 7/25/19.    Last clinic visit here 7/8/19  Next clinic appointment 8/13/19    Thank you.    Johnna Taveras   [facility]  RN

## 2019-08-08 NOTE — TELEPHONE ENCOUNTER
BPP ordered weekly   Wale Saavedra    Routing comment      Please call patient to notify and give phone number to Radiology scheduling.    Thank you.    Johnna Taveras   [facility]  RN

## 2019-08-09 ENCOUNTER — TELEPHONE (OUTPATIENT)
Dept: OBGYN | Facility: CLINIC | Age: 29
End: 2019-08-09

## 2019-08-09 NOTE — TELEPHONE ENCOUNTER
"Return call to patient.  Spoke with patient on the phone.    S-(situation): Patient calling with concerns of not being able to sleep for the past two nights. Patient reports she is unsure if she has insomnia or more being uncomfortable and can't sleep. Patient reports nausea over the last weekend which did improve during the week but has now returned. Patient denies constipation or diarrhea. Patient reports \" not feeling myself.\" Patient denies fever. Patient denies headache. Patient denies visual changes. Patient denies pain in RUQ. Patient reports swelling in her ankles which improves overnight. Patient denies bright red bleeding or leaking of fluid. Patient unaware of labor like contractions. Patient reports both babies are very active.    B-(background):  at 30W with Di/Di twins    A-(assessment): 30W pregnant with twins    R-(recommendations): Reassurance provided. Reviewed red flags with patient to be concerned for. If develop, recommend calling [facility] ([phone]) over the weekend. Reviewed strategies for nausea and changes in body that may be causing this. Reviewed inability to sleep at night, may try Benadryl or Unisom. Patient has appointment next week on Tuesday. Will check in and follow up then as well.    Pt in agreement and reports understanding.  Patient will call back or call [facility] ([phone]) with further questions.    Johnna Taveras   [facility]  RN      "

## 2019-08-09 NOTE — TELEPHONE ENCOUNTER
Reason for Call:  Other     Detailed comments: EDC: 10/01/2019    Pt states she does not feel good. Feels lightheaded, restless night, nausea for the past 2 days. Ankle pain due to swelling in feet. - Please advise    Phone Number Patient can be reached at: Home number on file [phone] (home)    Best Time: Any    Can we leave a detailed message on this number? YES    Call taken on 8/9/2019 at 11:22 AM by Denise Behrendt

## 2019-08-13 ENCOUNTER — COMMUNICATION - HEALTHEAST (OUTPATIENT)
Dept: MATERNAL FETAL MEDICINE | Facility: HOSPITAL | Age: 29
End: 2019-08-13

## 2019-08-15 ENCOUNTER — PRENATAL OFFICE VISIT (OUTPATIENT)
Dept: OBGYN | Facility: CLINIC | Age: 29
End: 2019-08-15
Payer: COMMERCIAL

## 2019-08-15 VITALS
BODY MASS INDEX: 29.09 KG/M2 | HEART RATE: 68 BPM | WEIGHT: 170.4 LBS | TEMPERATURE: 98.2 F | HEIGHT: 64 IN | RESPIRATION RATE: 16 BRPM | SYSTOLIC BLOOD PRESSURE: 123 MMHG | DIASTOLIC BLOOD PRESSURE: 81 MMHG

## 2019-08-15 DIAGNOSIS — O30.043 DICHORIONIC DIAMNIOTIC TWIN PREGNANCY IN THIRD TRIMESTER: Primary | ICD-10-CM

## 2019-08-15 DIAGNOSIS — Z34.82 PRENATAL CARE, SUBSEQUENT PREGNANCY IN SECOND TRIMESTER: ICD-10-CM

## 2019-08-15 PROCEDURE — 99207 ZZC PRENATAL VISIT: CPT | Performed by: OBSTETRICS & GYNECOLOGY

## 2019-08-15 ASSESSMENT — MIFFLIN-ST. JEOR: SCORE: 1477.93

## 2019-08-15 NOTE — PROGRESS NOTES
"CC: Here for routine prenatal visit @ 31w2d   HPI:  Feeling well; due for next growth scan in 3 weeks; weekly BPP started; would like to set up RCS and BILATERAL TUBAL STERILIZATION on 10/1/19    PE: /81 (BP Location: Right arm, Patient Position: Sitting, Cuff Size: Adult Regular)   Pulse 68   Temp 98.2  F (36.8  C) (Tympanic)   Resp 16   Ht 1.626 m (5' 4\")   Wt 77.3 kg (170 lb 6.4 oz)   LMP 01/08/2019   Breastfeeding? No   BMI 29.25 kg/m     See OB flowsheet      A:  1. Dichorionic diamniotic twin pregnancy in third trimester      2. Prenatal care, subsequent pregnancy in second trimester    - US OB Twins Follow Up Repeat; Future      Routine prenatal care  RTC 2 weeks.      Mahi Mills M.D.     "

## 2019-08-15 NOTE — NURSING NOTE
"Initial /81 (BP Location: Right arm, Patient Position: Sitting, Cuff Size: Adult Regular)   Pulse 68   Temp 98.2  F (36.8  C) (Tympanic)   Resp 16   Ht 1.626 m (5' 4\")   Wt 77.3 kg (170 lb 6.4 oz)   LMP 01/08/2019   Breastfeeding? No   BMI 29.25 kg/m   Estimated body mass index is 29.25 kg/m  as calculated from the following:    Height as of this encounter: 1.626 m (5' 4\").    Weight as of this encounter: 77.3 kg (170 lb 6.4 oz). .    Diandra Greene, [facility]    "

## 2019-08-16 ENCOUNTER — TELEPHONE (OUTPATIENT)
Dept: OBGYN | Facility: CLINIC | Age: 29
End: 2019-08-16

## 2019-08-16 NOTE — TELEPHONE ENCOUNTER
"Tiara Johnston  1990  [id number]    You are now scheduled for surgery at [facility].  Below are the details for your surgery.  Please read the \"Preparing for Your Surgery\" instructions and let us know if you have any questions.    Type of surgery: Repeat  section with bilateral tubal sterilization  Surgeon:  Mahi Mills MD  Location of surgery: [facility] OR    Date of surgery: 10-1-19    Time: 7:30am   Arrival Time: [facility]am [facility]    Time can change, to be confirmed a couple of days prior by pre-op surgery nurse.    Pre-Op Appt Date: Last OB visit  Post-Op Appt Date: To be determined by provider     Packet sent out: Yes at next appt  Pre-cert/Authorization completed:  TBD by Financial Securing Office.   MA Sterilization/Hysterectomy Acknowledgment Consent signed: Not Applicable    [facility]  [phone]    Fax: [phone]  Same Day Surgery [phone]  Fax: [phone]  [facility] [phone]      "

## 2019-08-19 ENCOUNTER — HOSPITAL ENCOUNTER (OUTPATIENT)
Dept: ULTRASOUND IMAGING | Facility: CLINIC | Age: 29
Discharge: HOME | End: 2019-08-19
Attending: OBSTETRICS & GYNECOLOGY | Admitting: OBSTETRICS & GYNECOLOGY
Payer: COMMERCIAL

## 2019-08-19 DIAGNOSIS — O30.043 DICHORIONIC DIAMNIOTIC TWIN PREGNANCY IN THIRD TRIMESTER: ICD-10-CM

## 2019-08-19 PROCEDURE — 76819 FETAL BIOPHYS PROFIL W/O NST: CPT

## 2019-08-26 ENCOUNTER — PRENATAL OFFICE VISIT (OUTPATIENT)
Dept: OBGYN | Facility: CLINIC | Age: 29
End: 2019-08-26
Payer: COMMERCIAL

## 2019-08-26 ENCOUNTER — TELEPHONE (OUTPATIENT)
Dept: OBGYN | Facility: CLINIC | Age: 29
End: 2019-08-26

## 2019-08-26 ENCOUNTER — HOSPITAL ENCOUNTER (OUTPATIENT)
Dept: ULTRASOUND IMAGING | Facility: CLINIC | Age: 29
Discharge: HOME | End: 2019-08-26
Attending: OBSTETRICS & GYNECOLOGY | Admitting: OBSTETRICS & GYNECOLOGY
Payer: COMMERCIAL

## 2019-08-26 VITALS
DIASTOLIC BLOOD PRESSURE: 95 MMHG | BODY MASS INDEX: 30.54 KG/M2 | TEMPERATURE: 97.7 F | HEART RATE: 77 BPM | HEIGHT: 64 IN | WEIGHT: 178.9 LBS | RESPIRATION RATE: 18 BRPM | SYSTOLIC BLOOD PRESSURE: 138 MMHG

## 2019-08-26 DIAGNOSIS — R03.0 ELEVATED BP WITHOUT DIAGNOSIS OF HYPERTENSION: ICD-10-CM

## 2019-08-26 DIAGNOSIS — O30.043 DICHORIONIC DIAMNIOTIC TWIN PREGNANCY IN THIRD TRIMESTER: Primary | ICD-10-CM

## 2019-08-26 DIAGNOSIS — O30.043 DICHORIONIC DIAMNIOTIC TWIN PREGNANCY IN THIRD TRIMESTER: ICD-10-CM

## 2019-08-26 LAB
ALT SERPL W P-5'-P-CCNC: 17 U/L (ref 0–50)
AST SERPL W P-5'-P-CCNC: 24 U/L (ref 0–45)
CREAT SERPL-MCNC: 0.49 MG/DL (ref 0.52–1.04)
CREAT UR-MCNC: 93 MG/DL
ERYTHROCYTE [DISTWIDTH] IN BLOOD BY AUTOMATED COUNT: 13.8 % (ref 10–15)
GFR SERPL CREATININE-BSD FRML MDRD: >90 ML/MIN/{1.73_M2}
HCT VFR BLD AUTO: 34.9 % (ref 35–47)
HGB BLD-MCNC: 11.6 G/DL (ref 11.7–15.7)
MCH RBC QN AUTO: 25.4 PG (ref 26.5–33)
MCHC RBC AUTO-ENTMCNC: 33.2 G/DL (ref 31.5–36.5)
MCV RBC AUTO: 77 FL (ref 78–100)
PLATELET # BLD AUTO: 246 10E9/L (ref 150–450)
PROT UR-MCNC: 12.31 G/L
PROT/CREAT 24H UR: 13.28 G/G CR (ref 0–0.2)
RBC # BLD AUTO: 4.56 10E12/L (ref 3.8–5.2)
URATE SERPL-MCNC: 5.8 MG/DL (ref 2.6–6)
WBC # BLD AUTO: 7.9 10E9/L (ref 4–11)

## 2019-08-26 PROCEDURE — 84550 ASSAY OF BLOOD/URIC ACID: CPT | Performed by: OBSTETRICS & GYNECOLOGY

## 2019-08-26 PROCEDURE — 36415 COLL VENOUS BLD VENIPUNCTURE: CPT | Performed by: OBSTETRICS & GYNECOLOGY

## 2019-08-26 PROCEDURE — 85027 COMPLETE CBC AUTOMATED: CPT | Performed by: OBSTETRICS & GYNECOLOGY

## 2019-08-26 PROCEDURE — 99207 ZZC PRENATAL VISIT: CPT | Performed by: OBSTETRICS & GYNECOLOGY

## 2019-08-26 PROCEDURE — 82565 ASSAY OF CREATININE: CPT | Performed by: OBSTETRICS & GYNECOLOGY

## 2019-08-26 PROCEDURE — 84460 ALANINE AMINO (ALT) (SGPT): CPT | Performed by: OBSTETRICS & GYNECOLOGY

## 2019-08-26 PROCEDURE — 84450 TRANSFERASE (AST) (SGOT): CPT | Performed by: OBSTETRICS & GYNECOLOGY

## 2019-08-26 PROCEDURE — 59425 ANTEPARTUM CARE ONLY: CPT | Performed by: OBSTETRICS & GYNECOLOGY

## 2019-08-26 PROCEDURE — 84156 ASSAY OF PROTEIN URINE: CPT | Performed by: OBSTETRICS & GYNECOLOGY

## 2019-08-26 PROCEDURE — 76819 FETAL BIOPHYS PROFIL W/O NST: CPT

## 2019-08-26 RX ORDER — BETAMETHASONE SODIUM PHOSPHATE AND BETAMETHASONE ACETATE 3; 3 MG/ML; MG/ML
12 INJECTION, SUSPENSION INTRA-ARTICULAR; INTRALESIONAL; INTRAMUSCULAR; SOFT TISSUE ONCE
Status: ACTIVE | OUTPATIENT
Start: 2019-08-27

## 2019-08-26 ASSESSMENT — MIFFLIN-ST. JEOR: SCORE: 1511.49

## 2019-08-26 NOTE — LETTER
8/26/2019      To whom this may concern:    Tiara Johnston may no longer work effective immediately due to complications of pregnancy.      Sincerely,        Mahi Mills MD  [facility]

## 2019-08-26 NOTE — PROGRESS NOTES
Pre-E labs: normal except proteinuria 13+ grams;  Plan: BMZ 12mg IM today and tomorrow (with BP check)  Off work, rest as possible; monitor for Pre-E severe signs  Mahi Mills MD  [facility]

## 2019-08-26 NOTE — PROGRESS NOTES
Prenatal Breastfeeding Education Toolkit provided for patient to review, helping her to make an informed decision on a feeding choice for her baby. Questions directed to the provider.  Lakeshia Tate on 8/26/2019 at 1:24 PM  Gave pt. BreastFeeding Worksheet at today's visit to fill out.   Lakeshia Tate on 8/26/2019 at 1:24 PM

## 2019-08-26 NOTE — PROGRESS NOTES
"CC: Here for routine prenatal visit @ 32w6d   HPI:  States feeling well; periodic mild right temporal headache, no scotomata; swelling in both feet    PE: BP (!) 138/95 (BP Location: Right arm, Patient Position: Chair, Cuff Size: Adult Regular)   Pulse 77   Temp 97.7  F (36.5  C) (Tympanic)   Resp 18   Ht 1.626 m (5' 4\")   Wt 81.1 kg (178 lb 14.4 oz)   LMP 01/08/2019   Breastfeeding? No   BMI 30.71 kg/m     See OB flowsheet  DTR 2+    A:  1. Dichorionic diamniotic twin pregnancy in third trimester    - Protein  random urine with Creat Ratio  - Uric acid  - AST  - ALT  - Creatinine  - CBC with platelets    2. Elevated BP without diagnosis of hypertension    - Protein  random urine with Creat Ratio  - Uric acid  - AST  - ALT  - Creatinine  - CBC with platelets      Advised pt that should be off work for remainder of pregnancy, watch for signs of pre-E: severe headache, scotomata, RUQ pain, general sense of not feeling well;  Check pre-E labs today; pt understands that may need to consider delivery of babies prior to planned 38 weeks  Continue weekly BPP  RTC 1 weeks.      Mahi Mills M.D.     "

## 2019-08-26 NOTE — NURSING NOTE
"Initial BP (!) 138/95 (BP Location: Right arm, Patient Position: Chair, Cuff Size: Adult Regular)   Pulse 77   Temp 97.7  F (36.5  C) (Tympanic)   Resp 18   Ht 1.626 m (5' 4\")   Wt 81.1 kg (178 lb 14.4 oz)   LMP 01/08/2019   Breastfeeding? No   BMI 30.71 kg/m   Estimated body mass index is 30.71 kg/m  as calculated from the following:    Height as of this encounter: 1.626 m (5' 4\").    Weight as of this encounter: 81.1 kg (178 lb 14.4 oz). .    Lakeshia Tate CMA    "

## 2019-08-26 NOTE — TELEPHONE ENCOUNTER
Reason for Call:  Request for results:    Name of test or procedure: labs    Date of test of procedure: 8-26-19    Location of the test or procedure: [place]    OK to leave the result message on voice mail or with a family member? YES    Phone number Patient can be reached at:  Home number on file [phone] (home)    Additional comments:   Can't access her [employer].    Call taken on 8/26/2019 at 3:18 PM by Geraldine Toro

## 2019-08-27 ENCOUNTER — HOSPITAL ENCOUNTER (OUTPATIENT)
Facility: CLINIC | Age: 29
End: 2019-08-27
Attending: OBSTETRICS & GYNECOLOGY | Admitting: OBSTETRICS & GYNECOLOGY
Payer: COMMERCIAL

## 2019-08-27 ENCOUNTER — HOSPITAL ENCOUNTER (OUTPATIENT)
Facility: CLINIC | Age: 29
Discharge: ANOTHER HOSPITAL | End: 2019-08-27
Attending: OBSTETRICS & GYNECOLOGY | Admitting: OBSTETRICS & GYNECOLOGY
Payer: COMMERCIAL

## 2019-08-27 ENCOUNTER — ALLIED HEALTH/NURSE VISIT (OUTPATIENT)
Dept: OBGYN | Facility: CLINIC | Age: 29
End: 2019-08-27
Payer: COMMERCIAL

## 2019-08-27 VITALS — TEMPERATURE: 98.4 F | DIASTOLIC BLOOD PRESSURE: 82 MMHG | SYSTOLIC BLOOD PRESSURE: 138 MMHG

## 2019-08-27 VITALS — HEART RATE: 87 BPM | DIASTOLIC BLOOD PRESSURE: 103 MMHG | SYSTOLIC BLOOD PRESSURE: 155 MMHG

## 2019-08-27 DIAGNOSIS — O14.93 PRE-ECLAMPSIA IN THIRD TRIMESTER: Primary | ICD-10-CM

## 2019-08-27 DIAGNOSIS — O30.043 DICHORIONIC DIAMNIOTIC TWIN PREGNANCY IN THIRD TRIMESTER: Primary | ICD-10-CM

## 2019-08-27 DIAGNOSIS — R03.0 ELEVATED BP WITHOUT DIAGNOSIS OF HYPERTENSION: ICD-10-CM

## 2019-08-27 DIAGNOSIS — O30.043 DICHORIONIC DIAMNIOTIC TWIN PREGNANCY IN THIRD TRIMESTER: ICD-10-CM

## 2019-08-27 DIAGNOSIS — O12.13 GESTATIONAL PROTEINURIA IN THIRD TRIMESTER: ICD-10-CM

## 2019-08-27 PROCEDURE — G0463 HOSPITAL OUTPT CLINIC VISIT: HCPCS | Mod: 25

## 2019-08-27 PROCEDURE — 59025 FETAL NON-STRESS TEST: CPT | Mod: 76

## 2019-08-27 PROCEDURE — 96365 THER/PROPH/DIAG IV INF INIT: CPT

## 2019-08-27 PROCEDURE — 59025 FETAL NON-STRESS TEST: CPT

## 2019-08-27 PROCEDURE — 99207 ZZC PRENATAL VISIT: CPT | Performed by: OBSTETRICS & GYNECOLOGY

## 2019-08-27 PROCEDURE — G0463 HOSPITAL OUTPT CLINIC VISIT: HCPCS

## 2019-08-27 PROCEDURE — 25800030 ZZH RX IP 258 OP 636: Performed by: OBSTETRICS & GYNECOLOGY

## 2019-08-27 PROCEDURE — 12000000 ZZH R&B MED SURG/OB

## 2019-08-27 PROCEDURE — 25000128 H RX IP 250 OP 636: Performed by: OBSTETRICS & GYNECOLOGY

## 2019-08-27 RX ORDER — LABETALOL 20 MG/4 ML (5 MG/ML) INTRAVENOUS SYRINGE
80 EVERY 10 MIN PRN
Status: DISCONTINUED | OUTPATIENT
Start: 2019-08-27 | End: 2019-08-27 | Stop reason: HOSPADM

## 2019-08-27 RX ORDER — MAGNESIUM SULFATE HEPTAHYDRATE 40 MG/ML
2 INJECTION, SOLUTION INTRAVENOUS
Status: DISCONTINUED | OUTPATIENT
Start: 2019-08-27 | End: 2019-08-27 | Stop reason: HOSPADM

## 2019-08-27 RX ORDER — NIFEDIPINE 10 MG/1
10 CAPSULE ORAL
Status: DISCONTINUED | OUTPATIENT
Start: 2019-08-27 | End: 2019-08-27 | Stop reason: HOSPADM

## 2019-08-27 RX ORDER — MAGNESIUM SULFATE HEPTAHYDRATE 40 MG/ML
4 INJECTION, SOLUTION INTRAVENOUS ONCE
Status: COMPLETED | OUTPATIENT
Start: 2019-08-27 | End: 2019-08-27

## 2019-08-27 RX ORDER — LABETALOL 20 MG/4 ML (5 MG/ML) INTRAVENOUS SYRINGE
20 EVERY 10 MIN PRN
Status: DISCONTINUED | OUTPATIENT
Start: 2019-08-27 | End: 2019-08-27 | Stop reason: HOSPADM

## 2019-08-27 RX ORDER — CALCIUM GLUCONATE 94 MG/ML
1 INJECTION, SOLUTION INTRAVENOUS
Status: DISCONTINUED | OUTPATIENT
Start: 2019-08-27 | End: 2019-08-27 | Stop reason: HOSPADM

## 2019-08-27 RX ORDER — BETAMETHASONE SODIUM PHOSPHATE AND BETAMETHASONE ACETATE 3; 3 MG/ML; MG/ML
12 INJECTION, SUSPENSION INTRA-ARTICULAR; INTRALESIONAL; INTRAMUSCULAR; SOFT TISSUE ONCE
Status: ACTIVE | OUTPATIENT
Start: 2019-08-28

## 2019-08-27 RX ORDER — MAGNESIUM SULFATE HEPTAHYDRATE 500 MG/ML
4 INJECTION, SOLUTION INTRAMUSCULAR; INTRAVENOUS
Status: DISCONTINUED | OUTPATIENT
Start: 2019-08-27 | End: 2019-08-27 | Stop reason: HOSPADM

## 2019-08-27 RX ORDER — BETAMETHASONE SODIUM PHOSPHATE AND BETAMETHASONE ACETATE 3; 3 MG/ML; MG/ML
12 INJECTION, SUSPENSION INTRA-ARTICULAR; INTRALESIONAL; INTRAMUSCULAR; SOFT TISSUE EVERY 24 HOURS
Qty: 4 ML | Refills: 0 | Status: SHIPPED | OUTPATIENT
Start: 2019-08-27 | End: 2019-08-29

## 2019-08-27 RX ORDER — LABETALOL 20 MG/4 ML (5 MG/ML) INTRAVENOUS SYRINGE
40 EVERY 10 MIN PRN
Status: DISCONTINUED | OUTPATIENT
Start: 2019-08-27 | End: 2019-08-27 | Stop reason: HOSPADM

## 2019-08-27 RX ORDER — MAGNESIUM SULFATE IN WATER 40 MG/ML
2 INJECTION, SOLUTION INTRAVENOUS CONTINUOUS
Status: DISCONTINUED | OUTPATIENT
Start: 2019-08-27 | End: 2019-08-27 | Stop reason: HOSPADM

## 2019-08-27 RX ORDER — SODIUM CHLORIDE, SODIUM LACTATE, POTASSIUM CHLORIDE, CALCIUM CHLORIDE 600; 310; 30; 20 MG/100ML; MG/100ML; MG/100ML; MG/100ML
10-125 INJECTION, SOLUTION INTRAVENOUS CONTINUOUS
Status: DISCONTINUED | OUTPATIENT
Start: 2019-08-27 | End: 2019-08-27 | Stop reason: HOSPADM

## 2019-08-27 RX ORDER — MAGNESIUM SULFATE HEPTAHYDRATE 40 MG/ML
4 INJECTION, SOLUTION INTRAVENOUS
Status: DISCONTINUED | OUTPATIENT
Start: 2019-08-27 | End: 2019-08-27 | Stop reason: HOSPADM

## 2019-08-27 RX ORDER — LORAZEPAM 2 MG/ML
2 INJECTION INTRAMUSCULAR
Status: DISCONTINUED | OUTPATIENT
Start: 2019-08-27 | End: 2019-08-27 | Stop reason: HOSPADM

## 2019-08-27 RX ADMIN — SODIUM CHLORIDE, POTASSIUM CHLORIDE, SODIUM LACTATE AND CALCIUM CHLORIDE 125 ML/HR: 600; 310; 30; 20 INJECTION, SOLUTION INTRAVENOUS at 15:11

## 2019-08-27 RX ADMIN — MAGNESIUM SULFATE HEPTAHYDRATE 2 G/HR: 40 INJECTION, SOLUTION INTRAVENOUS at 14:43

## 2019-08-27 RX ADMIN — MAGNESIUM SULFATE HEPTAHYDRATE 4 G: 40 INJECTION, SOLUTION INTRAVENOUS at 14:16

## 2019-08-27 NOTE — H&P
"[facility] Labor and Delivery History and Physical    Tiara Johnston MRN# [id number]   Age: 29 year old YOB: 1990     Date of Admission:  2019    Primary care provider: Karely Trevizo           Chief Complaint:   Tiara Johnston is a 29 year old female who is 33w0d pregnant and being admitted for observation.  She has a 33-week twins.  She has a history of  x2.  She also has a history of a venegas 24-week  delivery.          Pregnancy history:   She was seen for her usual visit in the clinic yesterday.  Her biophysical profile was 8 out of 8 x 2.    She has no significant discordance.  Preeclampsia labs were done yesterday after noting that she had new onset of 2+ pitting lower extremity edema and all were normal with the exception of random protein which showed 13 g.  Her blood pressure was within normal limits she was recommended to start steroids IM shot last night.  She did not show up last night.  She return to clinic today for blood pressure recheck blood pressure is 155/103.  She complains of feeling \"double over\" she has a headache which is bitemporal and occipital without visual changes.  She denies any right upper quadrant pain.  She is a diagnosis contractions.  She had no vaginal discharge.  On presentation today, she is declining steroids.  Discussion was that there was a study done in [place] regarding ADHD and steroid exposure in teenagers after being exposed in utero.  She is willing to have the magnesium sulfate      OBSTETRIC HISTORY:    OB History    Para Term  AB Living   5 4 3 1 0 3   SAB TAB Ectopic Multiple Live Births   0 0 0 0 3      # Outcome Date GA Lbr Bryson/2nd Weight Sex Delivery Anes PTL Lv   5 Current            4 Term 11              Birth Comments: System Generated. Please review and update pregnancy details.   3 Term 04/15/10 38w0d  3.033 kg (6 lb 11 oz) F CS-LTranv Gen  NICOLE      Birth Comments:  - repeat. "       Name: Monica      Apgar1: 8  Apgar5: 9   2  08 26w0d  1.304 kg (2 lb 14 oz) F CS-Unspec   NICOLE      Birth Comments: emergency c/s,placenta previa,infant in hospital x 2 months   1 Term 07 40w0d 03:00 2.665 kg (5 lb 14 oz) F    NICOLE      Birth Comments: none       EDC: Estimated Date of Delivery: 10/15/19    Prenatal Labs:   Lab Results   Component Value Date    ABO A 2011    RH  Pos 2011    AS Neg 2011    HEPBANG Negative 2011    CHPCRT Negative 2017    GCPCRT Negative 2017    TREPAB Negative 2011    RUBELLAABIGG 161 2011    HGB 11.6 (L) 2019    HIV non-reacive 2019       GBS Status:   Lab Results   Component Value Date    GBS  2011     Negative: No GBS DNA detected, presumed negative for GBS or number of bacteria   may be below the limit of detection of the assay.   Assay performed on incubated broth culture of specimen using Cepheid   SmartCycler(R) real-time PCR.       Active Problem List  Patient Active Problem List   Diagnosis     H/O:  section     Thyroid nodule     Contraception     HSV-1 infection     CARDIOVASCULAR SCREENING; LDL GOAL LESS THAN 160     Guttate psoriasis     Subclinical hyperthyroidism     Prenatal care, subsequent pregnancy     Dichorionic diamniotic twin pregnancy in third trimester     Pre-eclampsia in third trimester       Medication Prior to Admission  Facility-Administered Medications Prior to Admission   Medication Dose Route Frequency Provider Last Rate Last Dose     [START ON 2019] betamethasone acet & sod phos (CELESTONE) injection 12 mg  12 mg Intramuscular Once Mahi Mills MD         betamethasone acet & sod phos (CELESTONE) injection 12 mg  12 mg Intramuscular Once Mahi Mills MD         Medications Prior to Admission   Medication Sig Dispense Refill Last Dose     betamethasone acet & sod phos (BETAMETHASONE COMBO) 6 (3-3) MG/ML SUSP injection  Inject 2 mLs (12 mg) into the muscle every 24 hours for 2 doses 4 mL 0      calcium carbonate (TUMS) 500 MG chewable tablet Take 1 tablet (500 mg) by mouth 2 times daily (Patient not taking: Reported on 8/15/2019) 30 tablet 1 Unknown at Unknown time   .        Maternal Past Medical History:     Past Medical History:   Diagnosis Date     Chickenpox       delivery     placenta previa     Psoriasis                        Family History:   I have reviewed this patient's family history            Social History:   I have reviewed this patient's social history         Review of Systems:   The Review of Systems is negative other than noted in the HPI          Physical Exam:   Vitals were reviewed  All vitals stable      BP: (!) 152/92              Constitutional:   awake, alert, cooperative, no apparent distress, and appears stated age     Neck:   Supple, symmetrical, trachea midline, no adenopathy, thyroid symmetric, not enlarged and no tenderness, skin normal     Lungs:   No increased work of breathing, good air exchange, clear to auscultation bilaterally, no crackles or wheezing     Cardiovascular:   Normal apical impulse, regular rate and rhythm, normal S1 and S2, no S3 or S4, and no murmur noted     Abdomen:   scars noted Pfannenstiel, normal bowel sounds, soft, distended and non-tender and gravid     Genitounirinary:   External Genitalia:  General appearance; normal     Musculoskeletal:   2-3+ pitting edema to the knees bilaterally     Neurologic:   Awake, alert, oriented to name, place and time.  Cranial nerves II-XII are grossly intact.  Motor is 5 out of 5 bilaterally.  Cerebellar finger to nose, heel to shin intact.  Sensory is intact.  Babinski down going, Romberg negative, and gait is normal.  Deep Tendon Reflexes:  Right Bicep:  2+  Left Bicep:  2+  Right Knee:  4+  Left Knee:  4+  Right Ankle:  clonus  Left Ankle:  3+     Neuropsychiatric:   General: normal, calm and normal eye contact  Level of  consciousness: alert / normal  Affect: normal  Orientation: oriented to self, place, time and situation  Memory and insight: normal, memory for past and recent events intact and thought process normal     Skin:   no bruising or bleeding, normal skin color, texture, turgor, no redness, warmth, or swelling and no rashes      Cervix:Deferred  Presentation:Cephalic/Cephalic  Fetal Heart Rate Tracing: reactive and reassuring, appropriate for gestational age, Tier 1 (normal)  Tocometer: external monitor                       Assessment:   Tiara Johnston is a 33w0d pregnant female admitted with preeclampsia with severe features.          Plan:     Admit - see IP orders  Discussed case with Dr. Bal and Dr. Amaya at [facility].  They have agreed to take her in transfer  Magnesium sulfate was started with 4 g load.  That should be and by the time the transport arrives.  I would  run a drip of magnesium sulfate @ 2 gm/hour  during the transport.    She and her  are refusing steroids.  This due to their concerns about neurodevelopment later on as was noted above.    Wale Saavedra MD

## 2019-08-27 NOTE — TELEPHONE ENCOUNTER
Patient spoke with Dr. Mills on the phone.  Will come to clinic today for BMZ #1 and BP check.    Will need to return to clinic tomorrow for BMZ #2.    Pt in agreement and reports understanding.    Johnna Taveras   Ob/Gyn Clinic  RN

## 2019-08-27 NOTE — PROGRESS NOTES
Patient sent from clinic with severe pre-eclampsia.  IV x2 started, labs drawn. 's/ 80's. Reflexes brisk, 2 beats of clonus. Denies visual changes, complains only of headache and feeling weak. 4 gram loading dose of Magnesium Sulfate started, change to 2 gram/hr.  FHT A: 150, B: 140 initially, Cat 1.  Twin A: FHT up to 170 with moderate variability, returned to 150's before transport.  Mild contractions increased to every 4-5 minutes, pt denies pain, only mild pressure. 's/ 80's and temp 98.4 before transport. , Mian present, leaving to meet at [facility]. Report given to transport team and receiving ORQUIDEA Mike. Patient left at 1535 with transport team.

## 2019-08-27 NOTE — PROGRESS NOTES
Pt presented to clinic today for BP check and BMZ injection; she did NOT present to BC last night as recommended as she did not feel comfortable accepting the steroids at this time.  Today she states her headache has worsened, her face is more swollen and she doesn't feel well.  She and her  are wanting to decline the BMZ as they are concerned about adverse affects on the babies.    I discussed with them that the steroids can improve fetal lung maturity and it would be my recommendation for them to take the treatment.  I also discussed about pre-E, concern that she is worsening, and that she may infact have some severe symptoms with her headache.    PLAN:  Take to BC now in anticipation of transfer to tertiary facility for evaluation and likely premature delivery of twin pregnancy due to preeclampsia; discussed with Dr. Keri Mills MD  [facility]

## 2019-08-28 ENCOUNTER — TRANSFERRED RECORDS (OUTPATIENT)
Dept: HEALTH INFORMATION MANAGEMENT | Facility: CLINIC | Age: 29
End: 2019-08-28

## 2019-08-30 ENCOUNTER — TRANSFERRED RECORDS (OUTPATIENT)
Dept: HEALTH INFORMATION MANAGEMENT | Facility: CLINIC | Age: 29
End: 2019-08-30

## 2019-09-06 ENCOUNTER — TELEPHONE (OUTPATIENT)
Dept: OBGYN | Facility: CLINIC | Age: 29
End: 2019-09-06

## 2019-09-06 NOTE — TELEPHONE ENCOUNTER
Pt calling to get a hospital grade breast pump.  Pt would either like to get it at the supply store here or [facility].    Please advise -    Jhoana Fairbanks  Clinic Station

## 2019-09-09 NOTE — TELEPHONE ENCOUNTER
Dr. Duckworth (on-call provider at [facility]) called RN at [facility]. Gave verbal order to order a Hospital-Grade Breast pump as patient had a  delivery of her twins who are in NICU.     RN called and spoke with patient, she requested that we fax prescription over to [facility] in [place], [place] at Fax # [phone].       Order printed and faxed to number provided.       Carri Hernandez RN on 2019 at 10:47 AM

## 2019-12-16 ENCOUNTER — TELEPHONE (OUTPATIENT)
Dept: OBGYN | Facility: CLINIC | Age: 29
End: 2019-12-16

## 2019-12-16 NOTE — TELEPHONE ENCOUNTER
Received form from [facility] for Hospital Grade Electric Breast Pump.  Need to ask pt if baby is still  from her (in the NICU?)  If so - discharge date?  How much longer is the hospital grade electric breast pump needed?    -Geraldine Toro  Clinic Station

## 2019-12-16 NOTE — TELEPHONE ENCOUNTER
Form completed with below info and given to Dr. Mills to sign then will fax back.    -Geraldine Toro  Clinic Station [place]

## 2019-12-16 NOTE — TELEPHONE ENCOUNTER
Tiara returned call:   Both babies are home (discharged 09/25/2019)  Breast pump needed for another 2 months    If questions, contact pt @:  [phone] (ok to leave message)    Denise Behrendt  Specialty CSS

## 2019-12-27 ENCOUNTER — TELEPHONE (OUTPATIENT)
Dept: OBGYN | Facility: CLINIC | Age: 29
End: 2019-12-27

## 2019-12-27 NOTE — TELEPHONE ENCOUNTER
Panel Management Review    Date of last visit with a [facility] provider:  on 19.  Date of next visit with a [facility] provider: None.    Health Maintenance List    Health Maintenance   Topic Date Due     DTAP/TDAP/TD IMMUNIZATION (1 - Tdap) 1999     HPV  2009     PREVENTIVE CARE VISIT  2015     PAP  2017     PHQ-2  2019     INFLUENZA VACCINE (1) 2019     HIV SCREENING  Completed     IPV IMMUNIZATION  Aged Out     MENINGITIS IMMUNIZATION  Aged Out       Composite cancer screening  Chart review shows that this patient is due/due soon for the following Pap Smear  Lab Results   Component Value Date    PAP NIL 2014     Past Surgical History:   Procedure Laterality Date     C/SECTION, LOW TRANSVERSE  08,4/15/10,11, 19    , Low Transverse      SECTION, TUBAL LIGATION, COMBINED  2019     HEAD & NECK SURGERY      thyroid biopsy       Is hysterectomy listed in surgical history? No   Is mastectomy listed in surgical history? No     Summary:    Patient is due/failing the following:   Pap Smear    Action needed: Patient needs office visit for mammgram.    Type of outreach:  Sent letter.      Staff Signature:  Rosmery Norman CMA

## 2019-12-27 NOTE — LETTER
December 27, 2019      Tiara Johnston  [address] [address] [address]    Dear ,      This letter is to remind you that you are due for yourAnnual Exam .    Please call [phone] to schedule your appointment at your earliest convenience.     If you have completed the tests outside of [place], please have the results forwarded to our office. We will update the chart for your primary Physician to review before your next annual physical.     Sincerely,      Your [place] Care Team

## 2020-02-17 ENCOUNTER — TELEPHONE (OUTPATIENT)
Dept: OBGYN | Facility: CLINIC | Age: 30
End: 2020-02-17

## 2020-02-17 NOTE — TELEPHONE ENCOUNTER
Call to patient to notify of denial of PA for breast pump.   Twin birth 8/31/2019    Unable to reach patient.   Left message for patient to return call to clinic.    Patient may contact her insurance company for additional information.      Johnna Taveras   Ob/Gyn Clinic  RN

## 2020-06-23 ENCOUNTER — TRANSFERRED RECORDS (OUTPATIENT)
Dept: HEALTH INFORMATION MANAGEMENT | Facility: CLINIC | Age: 30
End: 2020-06-23

## 2020-06-30 ENCOUNTER — TRANSFERRED RECORDS (OUTPATIENT)
Dept: HEALTH INFORMATION MANAGEMENT | Facility: CLINIC | Age: 30
End: 2020-06-30

## 2020-12-06 ENCOUNTER — HEALTH MAINTENANCE LETTER (OUTPATIENT)
Age: 30
End: 2020-12-06

## 2021-05-29 NOTE — PROGRESS NOTES
[facility]  Genetic Counseling Consult    Patient: Tiara Johnston YOB: 1990   Date of Service:  06/24/2019      Tiara Johnston was seen at the [facility] for genetic consultation given abnormal ultrasound findings.      Impression/Plan:   Tiara had a blood draw for NIPT (Innatal test through [facility]).  Results are expected within 7-10 days, and will be available in [employer]. We will contact her to discuss the results, and a copy will be forwarded to the office of the referring OB provider.  Tiara will be contacted at the number she provided, [phone], and requests that detailed results be left in her voicemail if she cannot be reached.        Risk Assessment:   We explained that the risk for fetal chromosome abnormalities increases with maternal age. We discussed specific features of common chromosome abnormalities, including Down syndrome, trisomy 13, trisomy 18, and sex chromosome trisomies.      - At age 28 at midtrimester, the risk to have a baby with Down syndrome is 1 in 690.     - At age 28 at midtrimester, the risk to have a baby with any chromosome abnormality is 1 in 345.     These estimates reflect the risk for a single conception.  In a monozygotic twin pregnancy, each twin received their DNA from the same conception, and the twins are expected to have the same complement of chromosomes.  The risk numbers above would accurately reflect the risks for a chromosome abnormality to be affecting both twins in a monozygotic twin pregnancy.  In a dizygotic twin pregnancy, each twin comes from a separate conception and has separate risks for chromosome abnormalities.  In a dizygotic pregnancy, the risks above reflect the risk to each individual fetus to have a chromosome abnormality, and the overall risk for the pregnancy as a whole to have either fetus affected with a chromosome abnormality are doubled.   Zygosity cannot be definitively determined by ultrasound, but most dichorionic/diamniotic twin pregnancies are dizygotic.    During today's encounter we reviewed the finding from today's ultrasound, hypoplastic nasal bone.  This finding is not a birth defect and is not expected to require specific medical management, but when identified, this marker does increase the risks for a pregnancy to be affected with Down syndrome.  We discussed that NIPT is available as a screening tool to provide additional information if desired. We reviewed the specific limitations of NIPT in twin pregnancies, specifically, that the sensitivity and specificity of this testing is not as well established in twin pregnancies, and that the test cannot distinguish between one or both twins being affected.  This means that a positive result would mean that at least one, possible both twins, are at increased risk for having the chromosome abnormality identified.  NIPT cannot be used to assess for sex chromosome abnormalities in twin pregnancies, but can report on the presence or absence of Y chromosome material.  Both twins are male by ultrasound      Testing Options:   We discussed the following options:   Non-invasive Prenatal Testing (NIPT)    Maternal plasma cell-free DNA testing; first trimester ultrasound with nuchal translucency and nasal bone assessment is recommended, when appropriate    Screens for fetal trisomy 21, trisomy 13, trisomy 18, and sex chromosome aneuploidy    Cannot screen for open neural tube defects; maternal serum AFP after 15 weeks is recommended       and  Genetic Amniocentesis    Invasive procedure typically performed in the second trimester by which amniotic fluid is obtained for the purpose of chromosome analysis and/or other prenatal genetic analysis    Diagnostic results; >99% sensitivity for fetal chromosome abnormalities    AFAFP measurement tests for open neural tube defects        We reviewed the benefits  and limitations of this testing.  Screening tests provide a risk assessment specific to the pregnancy for certain fetal chromosome abnormalities, but cannot definitively diagnose or exclude a fetal chromosome abnormality.  Follow-up genetic counseling and consideration of diagnostic testing is recommended with any abnormal screening result.     Diagnostic tests carry inherent risks- including risk of miscarriage- that require careful consideration.  These tests can detect fetal chromosome abnormalities with greater than 99% certainty.  Results can be compromised by maternal cell contamination or mosaicism, and are limited by the resolution of cytogenetic G-banding technology.  There is no screening nor diagnostic test that can detect all forms of birth defects or mental disability.    It was a pleasure to be involved with Tiara [facility]. Face-to-face time of the meeting was 15 minutes.  Javier Solis MS, [facility]  Licensed Genetic Counselor  Phone: [phone]  Pager: [phone]

## 2021-05-29 NOTE — PROGRESS NOTES
"Please see \"Imaging\" tab under \"Chart Review\" for details of today's visit.    Betsy Catherine        "

## 2021-05-29 NOTE — PROGRESS NOTES
"Ordered Pediatric Cardiology echos in [facility] due to abnormal ultrasound findings, see \"Imaging\" for results. Pt to schedule at .   "

## 2021-05-30 NOTE — PROGRESS NOTES
"Please see \"Imaging\" tab under Chart Review for full report.  This ultrasound was performed in the [facility], and may be located under Care Everywhere.    Kaitlynn Cox MD  Maternal Fetal Medicine    "

## 2021-05-30 NOTE — PROGRESS NOTES
NST performed due to elevated heart rate of fetus 1.  Dr. Cox reviewed efm tracing. See NST/BPP Doc Flowsheet tab. Reassuring tracing on both fetuses.

## 2021-05-30 NOTE — PROGRESS NOTES
"Please see \"Imaging\" tab under Chart Review for full report.  This ultrasound was performed in the [facility], and may be located under Care Everywhere.    Kaitlynn Cox MD  Maternal Fetal Medicine    "

## 2021-05-30 NOTE — TELEPHONE ENCOUNTER
7/1/2019    Attempted to contact Tiara to review negative NIPT results.  The phone number provided by the patient is not a valid phone number.     Results came back negative for chromosome abnormalities in chromosomes 21, 18, & 13, as well as the sex chromosomes.  These test results do not definitively rule out the possibility of one of these conditions, but they do greatly reduce the likelihood.  The test identified the presence of Y chromosome material, which indicates that at least 1 twin is male.     Javier Solis MS, [facility]  Licensed Genetic Counselor  Phone: [phone]  Pager: [phone]

## 2021-05-30 NOTE — PROGRESS NOTES
"Please see \"Imaging\" tab under Chart Review for full report.  This ultrasound was performed in the [facility], and may be located under Care Everywhere.    Kaitlynn Cox MD  Maternal Fetal Medicine    "

## 2021-05-31 NOTE — TELEPHONE ENCOUNTER
M providers recommended patient to have wkly bpps and growth in 3 wks. Upon reaching patient via phone, patient stated that she is having these US done at her OB clinic.      Removing orders.    Evelia Krishnan, [facility] .

## 2021-09-25 ENCOUNTER — HEALTH MAINTENANCE LETTER (OUTPATIENT)
Age: 31
End: 2021-09-25

## 2022-01-15 ENCOUNTER — HEALTH MAINTENANCE LETTER (OUTPATIENT)
Age: 32
End: 2022-01-15

## 2023-01-07 ENCOUNTER — HEALTH MAINTENANCE LETTER (OUTPATIENT)
Age: 33
End: 2023-01-07

## 2023-04-22 ENCOUNTER — HEALTH MAINTENANCE LETTER (OUTPATIENT)
Age: 33
End: 2023-04-22

## 2023-10-13 NOTE — DISCHARGE INSTRUCTIONS
Return to the Emergency Room if the following occurs:     Worsened pain, expanding redness and swelling, fever, or for any concern at anytime.    Or, follow-up with the following provider as we discussed:     Return to either the dermatology or general surgery clinic for reevaluation if changing or bothersome, as discussed.    Medications discussed:    None new.  No changes.    If you received pain-relieving or sedating medication during your time in the ER, avoid alcohol, driving automobiles, or working with machinery.  Also, a responsible adult must stay with you.        Call the Nurse Advice Line at [phone] or [phone] for any concern at anytime.    
Yes

## 2024-03-18 ENCOUNTER — APPOINTMENT (OUTPATIENT)
Dept: GENERAL RADIOLOGY | Facility: CLINIC | Age: 34
End: 2024-03-18
Attending: NURSE PRACTITIONER
Payer: COMMERCIAL

## 2024-03-18 ENCOUNTER — HOSPITAL ENCOUNTER (EMERGENCY)
Facility: CLINIC | Age: 34
Discharge: HOME | End: 2024-03-18
Attending: NURSE PRACTITIONER | Admitting: NURSE PRACTITIONER
Payer: COMMERCIAL

## 2024-03-18 VITALS
HEART RATE: 63 BPM | TEMPERATURE: 98.2 F | RESPIRATION RATE: 18 BRPM | DIASTOLIC BLOOD PRESSURE: 64 MMHG | SYSTOLIC BLOOD PRESSURE: 111 MMHG | OXYGEN SATURATION: 99 %

## 2024-03-18 DIAGNOSIS — S63.501A WRIST SPRAIN, RIGHT, INITIAL ENCOUNTER: ICD-10-CM

## 2024-03-18 PROCEDURE — 73090 X-RAY EXAM OF FOREARM: CPT | Mod: RT

## 2024-03-18 PROCEDURE — G0463 HOSPITAL OUTPT CLINIC VISIT: HCPCS | Performed by: NURSE PRACTITIONER

## 2024-03-18 PROCEDURE — 99203 OFFICE O/P NEW LOW 30 MIN: CPT | Performed by: NURSE PRACTITIONER

## 2024-03-18 PROCEDURE — 73110 X-RAY EXAM OF WRIST: CPT | Mod: RT

## 2024-03-18 ASSESSMENT — COLUMBIA-SUICIDE SEVERITY RATING SCALE - C-SSRS
2. HAVE YOU ACTUALLY HAD ANY THOUGHTS OF KILLING YOURSELF IN THE PAST MONTH?: NO
6. HAVE YOU EVER DONE ANYTHING, STARTED TO DO ANYTHING, OR PREPARED TO DO ANYTHING TO END YOUR LIFE?: NO
1. IN THE PAST MONTH, HAVE YOU WISHED YOU WERE DEAD OR WISHED YOU COULD GO TO SLEEP AND NOT WAKE UP?: NO

## 2024-03-18 ASSESSMENT — ACTIVITIES OF DAILY LIVING (ADL)
ADLS_ACUITY_SCORE: 35
ADLS_ACUITY_SCORE: 35

## 2024-03-18 NOTE — ED PROVIDER NOTES
ED Provider Note  [facility]      History     Chief Complaint   Patient presents with    Work Comp     Happened today at 1:30 pm at work. Right wrist injury. Was lifting while cooking and heard a pop. Never had before. Radiating up to elbow and down to fingers. Can't move wrist. Wrapped it. Rates pain at an 8/10.      HPI  Tiara Johnston is a 33 year old female who presents with right wrist pain, reports that this happened at work while cooking she lifted up a heavy pan and felt a popping sensation in her right wrist.  Reports ongoing pain that does not improve despite use of ibuprofen or icing it.  Concerned that she may have a fracture in her wrist.  Denies any previous injuries to her wrist or hand or arm.            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      Thyroid nodule 03/02/2011     Priority: Medium     Right lobe non-tender  Thyroid US with FNA in 04/2011 benign.   TSH   Date Value Range Status   3/28/2014 0.45  0.4 - 5.0 mU/L Final   7/11/2011 0.67  0.4 - 5.0 mU/L Final   4/27/2011 0.25* 0.4 - 5.0 mU/L Final   3/21/2011 0.15* 0.4 - 5.0 mU/L Final           Contraception 03/02/2011     Priority: Medium     No current contraception. Reporting irregular periods  2014.       H/O:  section 2009     Priority: Medium     Class: Chronic        Past Medical History:    Past Medical History:   Diagnosis Date    Chickenpox     History of pre-eclampsia      delivery     Psoriasis        Past Surgical History:    Past Surgical History:   Procedure Laterality Date    C/SECTION, LOW TRANSVERSE  08,4/15/10,11, 19    , Low Transverse     SECTION, TUBAL LIGATION, COMBINED  2019    HEAD & NECK SURGERY      thyroid biopsy       Family History:    Family History   Problem Relation Age of Onset    Asthma Mother     Hypertension Mother     Thyroid Disease Mother     Hypertension Maternal Grandmother     Asthma Brother     Asthma Brother     Asthma Son     Respiratory Son         sleep apnea    Asthma Daughter     Respiratory Daughter         sleep apnea    Unknown/Adopted Father         unknown history    Alzheimer Disease Paternal Grandmother        Social History:  Marital Status:   [2]  Social History     Tobacco Use    Smoking status: Never    Smokeless tobacco: Never   Substance Use Topics    Alcohol use: Not Currently    Drug use: No        Medications:    betamethasone acet & sod phos (BETAMETHASONE COMBO) 6 (3-3) MG/ML SUSP injection  calcium carbonate (TUMS) 500 MG chewable tablet  triamcinolone (ARISTOCORT HP) 0.5 % external cream          Review of Systems  A medically appropriate review of systems was performed with pertinent positives and negatives noted in the HPI, and all other systems negative.    Physical Exam   Patient Vitals for the past 24 hrs:   BP Temp Temp src Pulse Resp SpO2   24 1450 111/64 98.2  F (36.8  C) Oral 63 18 99 %          Physical Exam  Musculoskeletal:      Right wrist: Swelling, tenderness and bony tenderness present. No deformity, effusion, lacerations, snuff box tenderness or crepitus. Decreased range of motion. Normal pulse.      Left wrist: Normal.        Arms:    General: alert  and in no acute distress on arrival  Head: atraumatic, normocephalic  Lungs:  nonlabored, CTA bilateral throughout.  CV: Regular rate and rhythm, extremities warm and perfused  Skin: no rashes, no diaphoresis and skin color normal  Neuro: Patient awake, alert, speech is fluent, appropriate historian.  Psychiatric: affect/mood normal, pleasant.        ED Course                 Procedures                    Results for orders placed or performed during the hospital encounter of 03/18/24 (from the past 24 hour(s))   XR Wrist Right G/E 3 Views    Narrative    XR WRIST RIGHT G/E 3 VIEWS, XR FOREARM RIGHT 2 VIEWS 3/18/2024 4:11 PM      HISTORY: injury at work to right wrist, pain extends up to elbow.    COMPARISON: None.       Impression    IMPRESSION:    Normal joint spaces and alignment. No acute fracture.    ABHISHEK ANGUIANO MD         SYSTEM ID:  [id number]   Radius/Ulna XR, PA & LAT, right    Narrative    XR WRIST RIGHT G/E 3 VIEWS, XR FOREARM RIGHT 2 VIEWS 3/18/2024 4:11 PM      HISTORY: injury at work to right wrist, pain extends up to elbow.    COMPARISON: None.       Impression    IMPRESSION:    Normal joint spaces and alignment. No acute fracture.    ABHISHEK ANGUIANO MD         SYSTEM ID:  [id number]       MEDICATIONS GIVEN IN THE EMERGENCY DEPARTMENT:  Medications - No data to display             Assessments & Plan (with Medical Decision Making)  33 year old female who presents to the Urgent Care for evaluation of right wrist sprain, ordered a right wrist splint with thumb to keep in a neutral position.  Sling ordered to decrease dependent edema.  Recommended ibuprofen or naproxen as an anti-inflammatory medication for pain.  Ortho consult placed, they will contact patient to schedule this.  Work note provided.  X-rays were negative for fracture.       I have reviewed the nursing notes.    I have reviewed the findings, diagnosis, plan and need for follow up with the patient.  Exercises provided please do  those 3 times daily.  Use of over-the-counter ibuprofen as needed for pain, icing and elevation is recommended.  And Ortho consult has been ordered they will contact you to schedule this.  If you prefer you can schedule this at the phone number provided [phone].  Work note will be provided to you you can return to work tomorrow recommend not using that arm while working tomorrow and will need further evaluation in 2 to 3 days.      NEW PRESCRIPTIONS STARTED AT TODAY'S ER VISIT  New Prescriptions    No medications on file       Final diagnoses:   Wrist sprain, right, initial encounter       3/18/2024   [facility] EMERGENCY DEPT       Nohemy Johnston, KAY CNP  03/23/24 [id number]

## 2024-03-18 NOTE — DISCHARGE INSTRUCTIONS
No fractures seen on x-rays today, wrist splint with thumb provided in urgent care recommend that you are wearing this is much as possible to keep your hand in a neutral position.  Exercises provided please do those 3 times daily.  Use of over-the-counter ibuprofen as needed for pain, icing and elevation is recommended.  And Ortho consult has been ordered they will contact you to schedule this.  If you prefer you can schedule this at the phone number provided [phone].  Work note will be provided to you you can return to work tomorrow recommend not using that arm while working tomorrow and will need further evaluation in 2 to 3 days.

## 2024-03-18 NOTE — Clinical Note
Tiara Johnston was seen and treated in our emergency department on 3/18/2024.  She may return to work on 03/19/2024.  Right wrist splint and sling provided in urgent care today, recommended that patient is wearing this at all times while working.  Further evaluation recommended in 2 to 3 days if symptoms or not resolving, and orthopedic consult has been ordered they will contact the patient to schedule.      If you have any questions or concerns, please don't hesitate to call.      Nohemy Johnston, APRN CNP

## 2024-03-22 ENCOUNTER — OFFICE VISIT (OUTPATIENT)
Dept: ORTHOPEDICS | Facility: CLINIC | Age: 34
End: 2024-03-22
Payer: COMMERCIAL

## 2024-03-22 DIAGNOSIS — S63.501A WRIST SPRAIN, RIGHT, INITIAL ENCOUNTER: ICD-10-CM

## 2024-03-22 PROCEDURE — 99204 OFFICE O/P NEW MOD 45 MIN: CPT | Performed by: PEDIATRICS

## 2024-03-22 RX ORDER — METHYLPREDNISOLONE 4 MG
TABLET, DOSE PACK ORAL
Qty: 21 TABLET | Refills: 0 | Status: SHIPPED | OUTPATIENT
Start: 2024-03-22

## 2024-03-22 NOTE — PROGRESS NOTES
ASSESSMENT & PLAN    Tiara was seen today for pain and injury.    Diagnoses and all orders for this visit:    Wrist sprain, right, initial encounter  -     Orthopedic  Referral  -     methylPREDNISolone (MEDROL DOSEPAK) 4 MG tablet therapy pack; Follow Package Directions  -     Wrist/Arm/Hand Bracking Supplies Order Wrist Brace; Right; non-thumb spica      This issue is acute and Unchanged.        ICD-10-CM    1. Wrist sprain, right, initial encounter  S63.501A Orthopedic  Referral     methylPREDNISolone (MEDROL DOSEPAK) 4 MG tablet therapy pack     Wrist/Arm/Hand Bracking Supplies Order Wrist Brace; Right; non-thumb spica        Patient Instructions   We discussed these other possible diagnosis: Right wrist injury, likely strain, TFCC or ECU tendon involvement.    We discussed the following treatment options: symptom treatment, activity modification/rest, imaging, rehab and referral. Following discussion, plan: will trial bracing initially, close follow up in 2 weeks.    Plan:  - Today's Plan of Care:  Wrist brace  Medrol dose pack (I reviewed the mechanism of action as well as risk/benefit profile of medications. Questions answered.)    Discussed activity considerations and other supportive care including Ice/Heat, OTC and other topical medications as needed.    Work Letter    -We also discussed other future treatment options:  Referral to Occupational Therapy  MRI right wrist    Follow Up: 2 weeks    Concerning signs and symptoms were reviewed and all questions were answered at this time.    Sabrina Rubio MD [facility]  Sports Medicine Physician  [facility]    -----  Chief Complaint   Patient presents with    Left Wrist - Pain, Injury       SUBJECTIVE  Tiara Johnston is a/an 33 year old female who is seen in consultation at the request of Nohemy Johnston C.N.P. for evaluation of right wrist pain.     The patient is seen by themselves.  The patient is Right handed    Onset: 3/18/24,  4 day(s) ago. Patient describes an injury as while working, she was draining pasta at work and felt a pop in her wrist.  Location of Pain: right wrist, ulnar side, TFCC  Worsened by: cooking, grabbing, carrying, pulling, doing her hair   Better with: bracing, not sure  Treatments tried: ice, Tylenol, previous imaging (xray 3/18/24), and casting/splinting/bracing  Associated symptoms: numbness, tingling, weakness of right wrist/hand, and feeling of instability    Went to the ED initially, x-rays were negative, has been braced since then    Orthopedic/Surgical history: NO  Social History/Occupation: working in a kitchen at [employer]      REVIEW OF SYSTEMS:  Review of Systems    OBJECTIVE:  There were no vitals taken for this visit.   General: healthy, alert and in no distress  Skin: no suspicious lesions or rash.  CV: distal perfusion intact   Resp: normal respiratory effort without conversational dyspnea   Psych: normal mood and affect  Gait: NORMAL  Neuro: Normal light sensory exam of upper extremity    Right Wrist and Hand exam    Inspection:       No swelling, bruising or deformity right    Tender:       Mild ulnar wrist and into forearm    Non Tender:       Remainder of the Wrist and Hand right    ROM:       Decreased ROM right wrist with flexion and extension right    Strength:       Decreased strength right wrist due to pain    Neurovascular:       2+ radial pulses bilaterally with brisk capillary refill and      normal sensation to light touch in the radial, median and ulnar nerve distributions      RADIOLOGY:  Final results and radiologist's interpretation, available in the Psychiatric health record.  Images were reviewed with the patient in the office today.  My personal interpretation of the performed imaging:     Reviewed XRs right wrist and forearm 3/18/2024 - no acute bony abnormality, no significant degenerative change    Results for orders placed or performed during the hospital encounter of 03/18/24   XR  Wrist Right G/E 3 Views    Narrative    XR WRIST RIGHT G/E 3 VIEWS, XR FOREARM RIGHT 2 VIEWS 3/18/2024 4:11 PM      HISTORY: injury at work to right wrist, pain extends up to elbow.    COMPARISON: None.       Impression    IMPRESSION:    Normal joint spaces and alignment. No acute fracture.    ABHISHEK ANGUIANO MD         SYSTEM ID:  [id number]   Radius/Ulna XR, PA & LAT, right    Narrative    XR WRIST RIGHT G/E 3 VIEWS, XR FOREARM RIGHT 2 VIEWS 3/18/2024 4:11 PM      HISTORY: injury at work to right wrist, pain extends up to elbow.    COMPARISON: None.       Impression    IMPRESSION:    Normal joint spaces and alignment. No acute fracture.    ABHISHEK ANGUIANO MD         SYSTEM ID:  [id number]       Review of prior external note(s) from - ER  Review of the result(s) of each unique test - XR

## 2024-03-22 NOTE — LETTER
March 22, 2024      Tiara Johnston  [address] [id number]        To Whom It May Concern,     Tiara was seen for a right wrist injury.  She can return to work with the following restrictions:  - limited use of right hand, no lifting or repetitive motions  - wear wrist brace    Off work if unable to work under these restrictions.    Follow up will be in 2 weeks.      Sincerely,        Sabrina Rubio MD

## 2024-03-22 NOTE — LETTER
3/22/2024         RE: Tiara Johnston  [address] [address]        Dear Colleague,    Thank you for referring your patient, Tiara Johnston, to the [facility] [place]. Please see a copy of my visit note below.    ASSESSMENT & PLAN    Tiara was seen today for pain and injury.    Diagnoses and all orders for this visit:    Wrist sprain, right, initial encounter  -     Orthopedic  Referral  -     methylPREDNISolone (MEDROL DOSEPAK) 4 MG tablet therapy pack; Follow Package Directions  -     Wrist/Arm/Hand Bracking Supplies Order Wrist Brace; Right; non-thumb spica      This issue is acute and Unchanged.        ICD-10-CM    1. Wrist sprain, right, initial encounter  S63.501A Orthopedic  Referral     methylPREDNISolone (MEDROL DOSEPAK) 4 MG tablet therapy pack     Wrist/Arm/Hand Bracking Supplies Order Wrist Brace; Right; non-thumb spica        Patient Instructions   We discussed these other possible diagnosis: Right wrist injury, likely strain, TFCC or ECU tendon involvement.    We discussed the following treatment options: symptom treatment, activity modification/rest, imaging, rehab and referral. Following discussion, plan: will trial bracing initially, close follow up in 2 weeks.    Plan:  - Today's Plan of Care:  Wrist brace  Medrol dose pack (I reviewed the mechanism of action as well as risk/benefit profile of medications. Questions answered.)    Discussed activity considerations and other supportive care including Ice/Heat, OTC and other topical medications as needed.    Work Letter    -We also discussed other future treatment options:  Referral to Occupational Therapy  MRI right wrist    Follow Up: 2 weeks    Concerning signs and symptoms were reviewed and all questions were answered at this time.    Sabrina Rubio MD [facility]  Sports Medicine Physician  [facility]    -----  Chief Complaint   Patient presents with     Left  Wrist - Pain, Injury       SUBJECTIVE  Tiara Johnston is a/an 33 year old female who is seen in consultation at the request of Nohemy Johnston C.N.P. for evaluation of right wrist pain.     The patient is seen by themselves.  The patient is Right handed    Onset: 3/18/24, 4 day(s) ago. Patient describes an injury as while working, she was draining pasta at work and felt a pop in her wrist.  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   General: healthy, alert and in no distress  Skin: no suspicious lesions or rash.  CV: distal perfusion intact   Resp: normal respiratory effort without conversational dyspnea   Psych: normal mood and affect  Gait: NORMAL  Neuro: Normal light sensory exam of upper extremity    Right Wrist and Hand exam    Inspection:       No swelling, bruising or deformity right    Tender:       Mild ulnar wrist and into forearm    Non Tender:       Remainder of the Wrist and Hand right    ROM:       Decreased ROM right wrist with flexion and extension right    Strength:       Decreased strength right wrist due to pain    Neurovascular:       2+ radial pulses bilaterally with brisk capillary refill and      normal sensation to light touch in the radial, median and ulnar nerve distributions      RADIOLOGY:  Final results and radiologist's interpretation, available in the [facility] health record.  Images were reviewed with the patient in the  office today.  My personal interpretation of the performed imaging:     Reviewed XRs right wrist and forearm 3/18/2024 - no acute bony abnormality, no significant degenerative change    Results for orders placed or performed during the hospital encounter of 03/18/24   XR Wrist Right G/E 3 Views    Narrative    XR WRIST RIGHT G/E 3 VIEWS, XR FOREARM RIGHT 2 VIEWS 3/18/2024 4:11 PM      HISTORY: injury at work to right wrist, pain extends up to elbow.    COMPARISON: None.       Impression    IMPRESSION:    Normal joint spaces and alignment. No acute fracture.    ABHISHEK ANGUIANO MD         SYSTEM ID:  [id number]   Radius/Ulna XR, PA & LAT, right    Narrative    XR WRIST RIGHT G/E 3 VIEWS, XR FOREARM RIGHT 2 VIEWS 3/18/2024 4:11 PM      HISTORY: injury at work to right wrist, pain extends up to elbow.    COMPARISON: None.       Impression    IMPRESSION:    Normal joint spaces and alignment. No acute fracture.    ABHISHEK ANGUIANO MD         SYSTEM ID:  [id number]       Review of prior external note(s) from - ER  Review of the result(s) of each unique test - XR             Again, thank you for allowing me to participate in the care of your patient.        Sincerely,        Sabrina Rubio MD

## 2024-03-22 NOTE — PATIENT INSTRUCTIONS
We discussed these other possible diagnosis: Right wrist injury, likely strain, TFCC or ECU tendon involvement.    We discussed the following treatment options: symptom treatment, activity modification/rest, imaging, rehab and referral. Following discussion, plan: will trial bracing initially, close follow up in 2 weeks.    Plan:  - Today's Plan of Care:  Wrist brace  Medrol dose pack (I reviewed the mechanism of action as well as risk/benefit profile of medications. Questions answered.)    Discussed activity considerations and other supportive care including Ice/Heat, OTC and other topical medications as needed.    Work Letter    -We also discussed other future treatment options:  Referral to Occupational Therapy  MRI right wrist    Follow Up: 2 weeks    If you have any further questions for your physician or physician s care team you can call [phone] and use option 3 to leave a voice message.

## 2024-04-08 ENCOUNTER — TELEPHONE (OUTPATIENT)
Dept: ORTHOPEDICS | Facility: CLINIC | Age: 34
End: 2024-04-08
Payer: MEDICAID

## 2024-04-08 NOTE — LETTER
April 9, 2024      Tiara Johnston  [address] [address]        To Whom It May Concern:    Tiara Johnston was seen in our clinic. She may return to work without restrictions.      Sincerely,        Sabrina Rubio MD    (Electronically signed)            Under care of Heme/Onc Dr Daisy Mtz  Patient received SQ injection of Xegva on 1/7/19  Currently on anastrozole 1mg tablet daily  Pt was previously on oxycodone but was weaned off to duloxetine  Pt to continue under care of heme/onc with their treatment plan

## 2024-04-08 NOTE — TELEPHONE ENCOUNTER
[facility] Call Center    Phone Message    May a detailed message be left on voicemail: yes     Reason for Call: Other: Patient is requesting a letter to remove work restrictions. She would like this emailed to her at [email or web address]. Please call her [phone]     Action Taken: Message routed to:  Other: [id number]    Travel Screening: Not Applicable

## 2024-04-09 NOTE — TELEPHONE ENCOUNTER
Called; spoke with patient. She is feeling better and would like work restrictions to be lifted. No longer wants to wear the brace during work.     Did schedule her for follow up appointment on 4/24 for right wrist recheck.     Please advise new letter lifting restrictions.     JANESSA Paredes

## 2024-04-10 NOTE — TELEPHONE ENCOUNTER
Called patient regarding work restrictions letter. She needs to see us back in clinic to lift work restrictions. We can extend the letter until her appointment time, but no restrictions will be changed or modified at this time.     Brittany Morales ATC, CSCS  Dr. Rubio's Clinical Coordinator  [facility] Team

## 2024-04-10 NOTE — TELEPHONE ENCOUNTER
Called; informed her of Brittany's message. Work restrictions will not be lifted till follow up.     Patient is in understanding with plan.     JANESSA Paredes

## 2024-04-10 NOTE — TELEPHONE ENCOUNTER
Other: Hello, patient is calling Brittany back. Please call her     Could we send this information to you in [place] or would you prefer to receive a phone call?:   Patient would prefer a phone call   Okay to leave a detailed message?: Yes at Cell number on file:    Telephone Information:   Mobile [phone]   Mobile Not on file.

## 2024-04-20 ENCOUNTER — HEALTH MAINTENANCE LETTER (OUTPATIENT)
Age: 34
End: 2024-04-20

## 2024-04-26 ENCOUNTER — OFFICE VISIT (OUTPATIENT)
Dept: ORTHOPEDICS | Facility: CLINIC | Age: 34
End: 2024-04-26
Payer: MEDICAID

## 2024-04-26 DIAGNOSIS — S63.501D WRIST SPRAIN, RIGHT, SUBSEQUENT ENCOUNTER: Primary | ICD-10-CM

## 2024-04-26 PROCEDURE — 99213 OFFICE O/P EST LOW 20 MIN: CPT | Performed by: PEDIATRICS

## 2024-04-26 NOTE — LETTER
4/26/2024         RE: Tiara Johnston  [address]  [facility] [address]        Dear Colleague,    Thank you for referring your patient, Tiara Johnston, to the [facility] [place]. Please see a copy of my visit note below.    ASSESSMENT & PLAN    Tiara was seen today for pain.    Diagnoses and all orders for this visit:    Wrist sprain, right, subsequent encounter  -     Cancel: Occupational Therapy  Referral; Future  -     Cancel: Occupational Therapy  Referral; Future  -     Occupational Therapy  Referral; Future      This issue is acute and Improving.      ICD-10-CM    1. Wrist sprain, right, subsequent encounter  S63.501D Occupational Therapy  Referral     CANCELED: Occupational Therapy  Referral     CANCELED: Occupational Therapy  Referral        Patient Instructions   We discussed these other possible diagnosis: Symptoms improving, discussed hand therapy to help with strengthening.    Plan:  - Today's Plan of Care:  Rehab: Occupational Therapy: [facility] - [phone]  Brace as needed, wrist widget    Discussed activity considerations and other supportive care including Ice/Heat, OTC and other topical medications as needed.    Work Letter updated    -We also discussed other future treatment options:  MRI right wrist if not improving    Follow Up: 2 months    Concerning signs and symptoms were reviewed and all questions were answered at this time.    Sabrina Rubio MD [facility]  Sports Medicine Physician  [facility]    SUBJECTIVE- Interim History April 26, 2024    Chief Complaint   Patient presents with     Right Wrist - Pain       Tiara Johnston is a 33 year old female who is seen in f/u up for Wrist sprain, right, subsequent encounter. Since last visit on 3/22/24 patient has been feeling back to normal.  Prednisone &  Bracing was helpful. She states she is ready to go back to work. Interested in OT  for strengthening.    Onset: 3/18/24,  6 weeks(s) ago. Patient describes an injury as while working, she was draining pasta at work and felt a pop in her wrist.  Note; She is looking for work clearance today    Initial History:  Location of Pain: right wrist, ulnar side, TFCC  Worsened by: cooking, grabbing, carrying, pulling, doing her hair   Better with: bracing, not sure  Treatments tried: ice, Tylenol, previous imaging (xray 3/18/24), and casting/splinting/bracing  Associated symptoms: No symptoms; numbness, tingling, weakness of right wrist/hand, and feeling of instability  Went to the ED initially, x-rays were negative, was placed in a brace     Orthopedic/Surgical history: NO  Social History/Occupation: working in a kitchen at [employer]    REVIEW OF SYSTEMS:  Review of Systems    OBJECTIVE:  There were no vitals taken for this visit.   General: healthy, alert and in no distress  Skin: no suspicious lesions or rash.  CV: distal perfusion intact  Resp: normal respiratory effort without conversational dyspnea   Psych: normal mood and affect  Gait: NORMAL  Neuro: Normal light sensory exam of upper extremity    Bilateral Wrist and Hand exam    Inspection:       No swelling, bruising or deformity bilateral    Tender:       none    Non Tender:       Remainder of the Wrist and Hand bilateral    ROM:       Full and symmetric active and passive range of motion of the forearm, wrist and digits bilateral    Strength:       5/5 strength in the muscles of the hand, wrist and forearm bilateral    Special Tests:        neg (-) TFCC compression test right    Neurovascular:       2+ radial pulses bilaterally with brisk capillary refill and      normal sensation to light touch in the radial, median and ulnar nerve distributions      RADIOLOGY:  Final results and radiologist's interpretation, available in the [facility] health record.  Images were reviewed with the patient in the office today.  My personal interpretation of the  performed imaging:     Reviewed normal XRs from 3/18/2024    Results for orders placed or performed during the hospital encounter of 03/18/24   XR Wrist Right G/E 3 Views    Narrative    XR WRIST RIGHT G/E 3 VIEWS, XR FOREARM RIGHT 2 VIEWS 3/18/2024 4:11 PM      HISTORY: injury at work to right wrist, pain extends up to elbow.    COMPARISON: None.       Impression    IMPRESSION:    Normal joint spaces and alignment. No acute fracture.    ABHISHEK ANGUIANO MD         SYSTEM ID:  [id number]   Radius/Ulna XR, PA & LAT, right    Narrative    XR WRIST RIGHT G/E 3 VIEWS, XR FOREARM RIGHT 2 VIEWS 3/18/2024 4:11 PM      HISTORY: injury at work to right wrist, pain extends up to elbow.    COMPARISON: None.       Impression    IMPRESSION:    Normal joint spaces and alignment. No acute fracture.    ABHISHEK ANGUIANO MD         SYSTEM ID:  [id number]         Review of the result(s) of each unique test - XR             Again, thank you for allowing me to participate in the care of your patient.        Sincerely,        Sabrina Rubio MD

## 2024-04-26 NOTE — PATIENT INSTRUCTIONS
We discussed these other possible diagnosis: Symptoms improving, discussed hand therapy to help with strengthening.    Plan:  - Today's Plan of Care:  Rehab: Occupational Therapy: [facility] - [phone]  Brace as needed, wrist widget    Discussed activity considerations and other supportive care including Ice/Heat, OTC and other topical medications as needed.    Work Letter updated    -We also discussed other future treatment options:  MRI right wrist if not improving    Follow Up: 2 months    If you have any further questions for your physician or physician s care team you can call [phone] and use option 3 to leave a voice message.

## 2024-04-26 NOTE — PROGRESS NOTES
ASSESSMENT & PLAN    Tiara was seen today for pain.    Diagnoses and all orders for this visit:    Wrist sprain, right, subsequent encounter  -     Cancel: Occupational Therapy  Referral; Future  -     Cancel: Occupational Therapy  Referral; Future  -     Occupational Therapy  Referral; Future      This issue is acute and Improving.      ICD-10-CM    1. Wrist sprain, right, subsequent encounter  S63.501D Occupational Therapy  Referral     CANCELED: Occupational Therapy  Referral     CANCELED: Occupational Therapy  Referral        Patient Instructions   We discussed these other possible diagnosis: Symptoms improving, discussed hand therapy to help with strengthening.    Plan:  - Today's Plan of Care:  [facility]hab[facility] [phone]  Brace as needed, wrist widget    Discussed activity considerations and other supportive care including Ice/Heat, OTC and other topical medications as needed.    Work Letter updated    -We also discussed other future treatment options:  MRI right wrist if not improving    Follow Up: 2 months    Concerning signs and symptoms were reviewed and all questions were answered at this time.    Sabrina Rubio MD [facility]  Sports Medicine Physician  [facility]    SUBJECTIVE- Interim History April 26, 2024    Chief Complaint   Patient presents with    Right Wrist - Pain       Tiara Johnston is a 33 year old female who is seen in f/u up for Wrist sprain, right, subsequent encounter. Since last visit on 3/22/24 patient has been feeling back to normal.  Prednisone &  Bracing was helpful. She states she is ready to go back to work. Interested in OT for strengthening.    Onset: 3/18/24,  6 weeks(s) ago. Patient describes an injury as while working, she was draining pasta at work and felt a pop in her wrist.  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   General: healthy, alert and in no distress  Skin: no suspicious lesions or rash.  CV: distal perfusion intact  Resp: normal respiratory effort without conversational dyspnea   Psych: normal mood and affect  Gait: NORMAL  Neuro: Normal light sensory exam of upper extremity    Bilateral Wrist and Hand exam    Inspection:       No swelling, bruising or deformity bilateral    Tender:       none    Non Tender:       Remainder of the Wrist and Hand bilateral    ROM:       Full and symmetric active and passive range of motion of the forearm, wrist and digits bilateral    Strength:       5/5 strength in the muscles of the hand, wrist and forearm bilateral    Special Tests:        neg (-) TFCC compression test right    Neurovascular:       2+ radial pulses bilaterally with brisk capillary refill and      normal sensation to light touch in the radial, median and ulnar nerve distributions      RADIOLOGY:  Final results and radiologist's interpretation, available in the [facility] health record.  Images were reviewed with the patient in the office today.  My personal interpretation of the performed imaging:     Reviewed normal XRs from 3/18/2024    Results for orders placed or performed during the hospital encounter of 03/18/24   XR Wrist Right G/E 3 Views    Narrative    XR WRIST RIGHT G/E 3 VIEWS, XR FOREARM RIGHT 2 VIEWS 3/18/2024 4:11  PM      HISTORY: injury at work to right wrist, pain extends up to elbow.    COMPARISON: None.       Impression    IMPRESSION:    Normal joint spaces and alignment. No acute fracture.    ABHISHEK ANGUIANO MD         SYSTEM ID:  [id number]   Radius/Ulna XR, PA & LAT, right    Narrative    XR WRIST RIGHT G/E 3 VIEWS, XR FOREARM RIGHT 2 VIEWS 3/18/2024 4:11 PM      HISTORY: injury at work to right wrist, pain extends up to elbow.    COMPARISON: None.       Impression    IMPRESSION:    Normal joint spaces and alignment. No acute fracture.    ABHISHEK ANGUIANO MD         SYSTEM ID:  [id number]         Review of the result(s) of each unique test - XR

## 2024-04-26 NOTE — LETTER
April 26, 2024      Tiara Johnston  [id number] [facility] [address]        To Whom It May Concern,     Tiara was seen for right wrist injury.  She can return to work without restrictions.    Use brace as needed  Start Occupational Therapy    Follow up in 2 months.      Sincerely,        Sabrina Rubio MD

## 2024-05-03 ENCOUNTER — THERAPY VISIT (OUTPATIENT)
Dept: OCCUPATIONAL THERAPY | Facility: CLINIC | Age: 34
End: 2024-05-03
Attending: PEDIATRICS
Payer: MEDICAID

## 2024-05-03 DIAGNOSIS — S63.501D WRIST SPRAIN, RIGHT, SUBSEQUENT ENCOUNTER: ICD-10-CM

## 2024-05-03 PROCEDURE — 97165 OT EVAL LOW COMPLEX 30 MIN: CPT | Mod: GO | Performed by: OCCUPATIONAL THERAPIST

## 2024-05-03 PROCEDURE — 97535 SELF CARE MNGMENT TRAINING: CPT | Mod: GO | Performed by: OCCUPATIONAL THERAPIST

## 2024-05-03 PROCEDURE — 97110 THERAPEUTIC EXERCISES: CPT | Mod: GO | Performed by: OCCUPATIONAL THERAPIST

## 2024-05-03 NOTE — PROGRESS NOTES
OCCUPATIONAL THERAPY EVALUATION  Type of Visit: Evaluation    See electronic medical record for Abuse and Falls Screening details.    Subjective      Presenting condition or subjective complaint:  Onset: 3/18/24,  6 weeks(s) ago. Patient describes an injury as while working, she was draining pasta at work and felt a pop in her wrist.  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  Hobbies/Interests:  cooking, baking    Patient goals for therapy:  Wants to be confident in using without it getting re aggravated and is wondering if there is a smaller type splint that would be helpful     Pain assessment: Pain denied, however did have pain during evaluation     Objective   ADDITIONAL HISTORY:  Right hand dominant  Patient reports symptoms of tingling   Transportation:  drives  Currently working in normal job without restrictions    Functional Outcome Measure:   Upper Extremity Functional Index Score: 76/80      (A lower score indicates greater disability.)      PAIN:  Denies pain accept occasional aching        EDEMA:  15.5 bilaterally - does not appear to be swollen          SENSATION: occasional tingling in thenar area         ROM:   Wrist ROM  Left AROM Right AROM    Extension  65 4/10 pain   Flexion  70   Radial Deviation (RD)  WNL- pain radially   Ulnar Deviation (UD)  WNL- no pain   UD with Th Flex     Supination  WNL   Pronation  WNL           RESISTED TESTING:  increased pain with resistance to APL/EPB  and pain in volar wrist with resistance to ECU    Positive  Nichole    STRENGTH:     Measured in pounds 5/3/2024 5/3/2024    Left Right   Average 38 mild pain 60     Lateral Pinch  Measured in pounds 5/3/2024 5/3/2024    Left Right   Average 6 pain radial wrist thumb area 11     3 Point Pinch  Measured in pounds 5/3/2024 5/3/2024    Left Right   Average 5 radial wrist/thumb pain 9       PALPATION:   Thumb Palpation mild   1st Dorsal Compartment 5/10 pain- also pressure there causes mild pain in ulnar wrist                      Assessment & Plan   CLINICAL IMPRESSIONS  Medical Diagnosis: Right wrist sprain    Treatment Diagnosis: pain affecting work    Impression/Assessment: Patient's limitations or Problem List includes: Pain, Decreased ROM/motion, Weakness, Decreased , and Decreased pinch of the right wrist and thumb which interferes with the patient's ability to perform Self Care Tasks ( ), Work Tasks, Recreational Activities, and Household Chores as compared to previous level of function.    Clinical Decision Making (Complexity):  Assessment of Occupational Performance: 1-3 Performance Deficits  Occupational Performance Limitations: shopping, work, and leisure activities  Clinical Decision Making (Complexity): Low complexity    PLAN OF CARE  Treatment Interventions:  Modalities: Infrared, Ultrasound  Interventions: Self-Care/Home Management, Therapeutic Activity, Therapeutic Exercise, Manual Therapy, Orthotic Fitting/Training    Long Term Goals   OT Goal 1  Goal Identifier: home program  Goal Description: Patient to be independent with HEP not needing more than 15% cuing  Rationale: In order to maximize safety and independence with ADL/IADLs  Target Date: 05/24/24  OT Goal 2  Goal Identifier: work  Goal Description: Patient to be able to continue to safely use wrist at work without pain  Rationale: In order to maximize safety and independence with ADL/IADLs  Target Date: 06/28/24      Frequency of Treatment: 1x/week  Duration of Treatment: 8 weeks      Recommended Referrals to Other Professionals:   Education Assessment: Learner/Method: Patient;Listening;Reading;Demonstration;No Barriers to Learning     Risks and benefits of evaluation/treatment have been explained.   Patient/Family/caregiver agrees with Plan of Care.     Evaluation Time:    OT Ramírez Low Complexity Minutes (35349): 20       Signing Clinician: FRANSISCO Gerber/L CHT  Occupational Therapist, Certified Hand Therapist

## 2024-08-27 NOTE — PROGRESS NOTES
DISCHARGE  Reason for Discharge: Note: This is a copy of patient's last daily visit and will also serve as their Discharge Summary as they have not been in for further treatment 30 days past this date. Final assessment of goals and physical and functional status , therefore unavailable.     Equipment Issued:     Discharge Plan: Patient to continue home program.    Referring Provider:  Sabrina Rubio    05/03/24 0500   Appointment Info   Treating Provider Zakia Serrano, OTR/L CHT   Total/Authorized Visits WC   Visits Used 1   Medical Diagnosis Right wrist sprain   OT Tx Diagnosis pain affecting work   Progress Note/Certification   Onset of Illness/Injury or Date of Surgery 03/18/24   Therapy Frequency 1x/week   Predicted Duration 8 weeks   Goals   OT Goals 2;1   OT Goal 1   Goal Identifier home program   Goal Description Patient to be independent with HEP not needing more than 15% cuing   Rationale In order to maximize safety and independence with ADL/IADLs   Target Date 05/24/24   OT Goal 2   Goal Identifier work   Goal Description Patient to be able to continue to safely use wrist at work without pain   Rationale In order to maximize safety and independence with ADL/IADLs   Target Date 06/28/24   Subjective Report   Subjective Report see Eval   Treatment Interventions (OT)   Interventions Self Care/Home Management;Therapeutic Procedure/Exercise   Self Care/Home Management   Self Care 1 education on Eval findings and activity modification relating to , different splints tried on to wear at work ( wrist widget, clutch, and comfort cool) Comfort cool provided the most relief   Self-Care/Home Mgmt/ADL, Compensatory, Meal Prep Minutes (13684) 10 Minutes   Skilled Intervention safety education needed   Patient Response/Progress demonstrates understanding   Therapeutic Procedure/Exercise   Therapeutic Procedure: strength, endurance, ROM, flexibility minutes (30146) 8   Skilled Intervention teaching of HEP   Ther  Proc 1 PTRX instruction   Ther Proc 1 - Details Exercise Name: deQuervain Overview  Exercise Name: Wrist Passive Range of Motion DeQuervain's Stretch  Exercise Name: Friction Massage - Sessions: 5 min a day  Exercise Name: Wrist Passive Range of Motion Extension, Sets: 1 - Reps: 3 - Sessions: 3, Notes: hold 15 -20 seconds- pain free   Eval/Assessments   OT Eval, Low Complexity Minutes (85140) 20   Education   Learner/Method Patient;Listening;Reading;Demonstration;No Barriers to Learning   Plan   Home program Exercise Name: deQuervain Overview  Exercise Name: Wrist Passive Range of Motion DeQuervain's Stretch  Exercise Name: Friction Massage - Sessions: 5 min a day  Exercise Name: Wrist Passive Range of Motion Extension, Sets: 1 - Reps: 3 - Sessions: 3, Notes: hold 15 -20 seconds- pain free, splinting as helpful   Plan for next session check if got splint   Comments   Comments ? Dequervains vs ECU?

## 2025-05-10 ENCOUNTER — HEALTH MAINTENANCE LETTER (OUTPATIENT)
Age: 35
End: 2025-05-10